# Patient Record
Sex: FEMALE | Race: WHITE | NOT HISPANIC OR LATINO | Employment: OTHER | URBAN - METROPOLITAN AREA
[De-identification: names, ages, dates, MRNs, and addresses within clinical notes are randomized per-mention and may not be internally consistent; named-entity substitution may affect disease eponyms.]

---

## 2017-08-18 ENCOUNTER — TELEPHONE (OUTPATIENT)
Dept: PRIMARY CARE CLINIC | Facility: CLINIC | Age: 82
End: 2017-08-18

## 2017-08-18 RX ORDER — NITROFURANTOIN MACROCRYSTALS 50 MG/1
CAPSULE ORAL
Qty: 90 CAPSULE | Refills: 3 | Status: ON HOLD | OUTPATIENT
Start: 2017-08-18 | End: 2017-12-08 | Stop reason: HOSPADM

## 2017-10-25 ENCOUNTER — TELEPHONE (OUTPATIENT)
Dept: PRIMARY CARE CLINIC | Facility: CLINIC | Age: 82
End: 2017-10-25

## 2017-10-25 NOTE — TELEPHONE ENCOUNTER
----- Message from Bri Doe MA sent at 10/25/2017  9:57 AM CDT -----  Contact: von forrest      ----- Message -----  From: Beth Hinojosa  Sent: 10/24/2017  11:37 AM  To: Nhan Potts Staff    Patient fell a couple nights ago. She is using the bathroom all over the home. She gives trouble giving baths etc. Home health aide nurse is what she is thinking to help her Please call back [phone] (home)   Thanks!

## 2017-10-27 ENCOUNTER — TELEPHONE (OUTPATIENT)
Dept: PRIMARY CARE CLINIC | Facility: CLINIC | Age: 82
End: 2017-10-27

## 2017-10-27 NOTE — TELEPHONE ENCOUNTER
----- Message from Jillian Gómez sent at 10/27/2017 11:28 AM CDT -----  Contact: Care giver  Briana, care giver [phone], Second time calling for home health to be order for patient, she feel out of bed on 10/20/2017 and can not take care of herself. Please advise. Thanks.

## 2017-11-02 ENCOUNTER — TELEPHONE (OUTPATIENT)
Dept: PRIMARY CARE CLINIC | Facility: CLINIC | Age: 82
End: 2017-11-02

## 2017-11-02 NOTE — TELEPHONE ENCOUNTER
----- Message from Jillian Gómez sent at 11/1/2017 10:32 AM CDT -----  Contact: Care taker  Briana, care taker [phone] daughter Tia [phone], Calling for an order for home health. She fell and has not been able to walk. Please advise. Thanks.

## 2017-11-02 NOTE — TELEPHONE ENCOUNTER
Spoke with daughter and notified that we would need her in for an appointment so that we can have documentation therefore will call me back so that we can get her one scheduled

## 2017-11-07 ENCOUNTER — TELEPHONE (OUTPATIENT)
Dept: PRIMARY CARE CLINIC | Facility: CLINIC | Age: 82
End: 2017-11-07

## 2017-11-07 NOTE — TELEPHONE ENCOUNTER
----- Message from Laura Buck sent at 11/7/2017 11:22 AM CST -----  Contact: Briana  Caregiver - Briana Lord - [phone] is calling/patient fell about two weeks ago and hurt her right knee and is having a lot of pain/she is asking if patient could be seen this week?/please advise

## 2017-11-07 NOTE — TELEPHONE ENCOUNTER
Caregiver states that patient is still having knee pain and would like to come in some time this week. Called and scheduled for thursday

## 2017-11-16 ENCOUNTER — OFFICE VISIT (OUTPATIENT)
Dept: PRIMARY CARE CLINIC | Facility: CLINIC | Age: 82
End: 2017-11-16
Payer: COMMERCIAL

## 2017-11-16 VITALS — DIASTOLIC BLOOD PRESSURE: 80 MMHG | TEMPERATURE: 98 F | HEART RATE: 77 BPM | SYSTOLIC BLOOD PRESSURE: 146 MMHG

## 2017-11-16 DIAGNOSIS — M10.061 ACUTE IDIOPATHIC GOUT OF RIGHT KNEE: ICD-10-CM

## 2017-11-16 DIAGNOSIS — B37.2 INTERTRIGINOUS CANDIDIASIS: ICD-10-CM

## 2017-11-16 DIAGNOSIS — M25.512 ACUTE PAIN OF BOTH SHOULDERS: Primary | ICD-10-CM

## 2017-11-16 DIAGNOSIS — M25.511 ACUTE PAIN OF BOTH SHOULDERS: Primary | ICD-10-CM

## 2017-11-16 DIAGNOSIS — M25.561 ACUTE PAIN OF RIGHT KNEE: ICD-10-CM

## 2017-11-16 PROCEDURE — 99999 PR PBB SHADOW E&M-EST. PATIENT-LVL III: CPT | Mod: PBBFAC,,, | Performed by: INTERNAL MEDICINE

## 2017-11-16 PROCEDURE — 96372 THER/PROPH/DIAG INJ SC/IM: CPT | Mod: S$GLB,,, | Performed by: INTERNAL MEDICINE

## 2017-11-16 PROCEDURE — 99213 OFFICE O/P EST LOW 20 MIN: CPT | Mod: 25,S$GLB,, | Performed by: INTERNAL MEDICINE

## 2017-11-16 RX ORDER — AMLODIPINE BESYLATE 5 MG/1
5 TABLET ORAL DAILY
COMMUNITY
End: 2018-01-03 | Stop reason: SDUPTHER

## 2017-11-16 RX ORDER — CLOTRIMAZOLE AND BETAMETHASONE DIPROPIONATE 10; .64 MG/G; MG/G
CREAM TOPICAL 2 TIMES DAILY
Status: CANCELLED | OUTPATIENT
Start: 2017-11-16

## 2017-11-16 RX ORDER — CLOTRIMAZOLE AND BETAMETHASONE DIPROPIONATE 10; .64 MG/G; MG/G
CREAM TOPICAL 2 TIMES DAILY
COMMUNITY
End: 2017-11-17 | Stop reason: SDUPTHER

## 2017-11-16 RX ORDER — CETIRIZINE HYDROCHLORIDE 10 MG/1
10 TABLET ORAL DAILY
Status: ON HOLD | COMMUNITY
End: 2017-12-08 | Stop reason: HOSPADM

## 2017-11-16 RX ORDER — KETOROLAC TROMETHAMINE 30 MG/ML
15 INJECTION, SOLUTION INTRAMUSCULAR; INTRAVENOUS
Status: COMPLETED | OUTPATIENT
Start: 2017-11-16 | End: 2017-11-16

## 2017-11-16 RX ORDER — ASPIRIN 81 MG/1
81 TABLET ORAL DAILY
COMMUNITY
End: 2020-01-01

## 2017-11-16 RX ORDER — GLIMEPIRIDE 4 MG/1
4 TABLET ORAL
COMMUNITY
End: 2018-01-03 | Stop reason: SDUPTHER

## 2017-11-16 RX ORDER — BETAMETHASONE SODIUM PHOSPHATE AND BETAMETHASONE ACETATE 3; 3 MG/ML; MG/ML
12 INJECTION, SUSPENSION INTRA-ARTICULAR; INTRALESIONAL; INTRAMUSCULAR; SOFT TISSUE
Status: COMPLETED | OUTPATIENT
Start: 2017-11-16 | End: 2017-11-16

## 2017-11-16 RX ORDER — METFORMIN HYDROCHLORIDE 500 MG/1
500 TABLET ORAL 2 TIMES DAILY WITH MEALS
COMMUNITY
End: 2018-01-03 | Stop reason: SDUPTHER

## 2017-11-16 RX ADMIN — KETOROLAC TROMETHAMINE 15 MG: 30 INJECTION, SOLUTION INTRAMUSCULAR; INTRAVENOUS at 05:11

## 2017-11-16 RX ADMIN — BETAMETHASONE SODIUM PHOSPHATE AND BETAMETHASONE ACETATE 12 MG: 3; 3 INJECTION, SUSPENSION INTRA-ARTICULAR; INTRALESIONAL; INTRAMUSCULAR; SOFT TISSUE at 05:11

## 2017-11-16 NOTE — PROGRESS NOTES
2 patient identifiers used, Celestone 12mg IM administered to left hip using aseptic technique, patient tolerated well, no bleeding to site bandaid applied. Ketorolac 30mg IM administered to right hip using aseptic technique, patient tolerated well, no bleeding to site bandage applied.

## 2017-11-17 RX ORDER — CLOTRIMAZOLE AND BETAMETHASONE DIPROPIONATE 10; .64 MG/G; MG/G
CREAM TOPICAL 2 TIMES DAILY
Qty: 45 G | Refills: 2 | Status: SHIPPED | OUTPATIENT
Start: 2017-11-17 | End: 2018-03-01 | Stop reason: SDUPTHER

## 2017-11-17 RX ORDER — FLUCONAZOLE 150 MG/1
150 TABLET ORAL DAILY
Qty: 7 TABLET | Refills: 1 | Status: SHIPPED | OUTPATIENT
Start: 2017-11-17 | End: 2017-11-24

## 2017-11-17 NOTE — PROGRESS NOTES
Subjective:       Patient ID: Hedy Pierre is a 89 y.o. female.    Chief Complaint: Arm Pain (bilat shoulder pain, s/p recent fall, xrays negative fx)    HPI Pt radha by neighbor has been falling down in ER had xrays no fx but hurt all over and bad itching skin rash groins braests and buttock with bed sores will try get H/H live alone  Review of Systems    Objective:      Physical Exam   Constitutional: She is oriented to person, place, and time. She appears well-developed and well-nourished. No distress.   HENT:   Head: Normocephalic and atraumatic.   Right Ear: External ear normal.   Left Ear: External ear normal.   Nose: Nose normal.   Mouth/Throat: Oropharynx is clear and moist. No oropharyngeal exudate.   Eyes: Conjunctivae and EOM are normal. Pupils are equal, round, and reactive to light. Right eye exhibits no discharge. Left eye exhibits no discharge.   Neck: Normal range of motion. Neck supple. No thyromegaly present.   Cardiovascular: Normal rate, regular rhythm, normal heart sounds and intact distal pulses.  Exam reveals no gallop and no friction rub.    No murmur heard.  Pulmonary/Chest: Effort normal and breath sounds normal. No respiratory distress. She has no wheezes. She has no rales. She exhibits no tenderness.   Abdominal: Soft. Bowel sounds are normal. She exhibits no distension. There is no tenderness. There is no rebound and no guarding.   Musculoskeletal: Normal range of motion. She exhibits tenderness (multiple joint pain worse at rt knee). She exhibits no edema or deformity.   Lymphadenopathy:     She has no cervical adenopathy.   Neurological: She is alert and oriented to person, place, and time.   Skin: Skin is warm and dry. Capillary refill takes less than 2 seconds. Rash (patchy erythematous satelite lesions under rt breast and groins ) noted. No erythema.   Psychiatric: She has a normal mood and affect. Judgment and thought content normal.   Nursing note and vitals reviewed.       Assessment:       1. Acute pain of both shoulders    2. Acute pain of right knee    3. Acute idiopathic gout of right knee    4. Intertriginous candidiasis        Plan:       Acute pain of both shoulders  -     betamethasone acetate-betamethasone sodium phosphate injection 12 mg; Inject 2 mLs (12 mg total) into the muscle one time.  -     ketorolac injection 15 mg; Inject 15 mg into the vein one time.    Acute pain of right knee    Acute idiopathic gout of right knee    Intertriginous candidiasis    Other orders  -     Cancel: clotrimazole-betamethasone 1-0.05% (LOTRISONE) cream; Apply topically 2 (two) times daily.

## 2017-11-28 ENCOUNTER — TELEPHONE (OUTPATIENT)
Dept: PRIMARY CARE CLINIC | Facility: CLINIC | Age: 82
End: 2017-11-28

## 2017-11-28 NOTE — TELEPHONE ENCOUNTER
----- Message from Jillian Araya sent at 11/24/2017  3:48 PM CST -----  Please call Briana Watson / [phone] ... Asking to discuss update on home health orders

## 2017-12-05 PROBLEM — N30.01 ACUTE CYSTITIS WITH HEMATURIA: Status: ACTIVE | Noted: 2017-12-05

## 2017-12-06 PROBLEM — L01.00 IMPETIGO: Status: ACTIVE | Noted: 2017-12-06

## 2017-12-06 PROBLEM — I10 ESSENTIAL HYPERTENSION: Status: ACTIVE | Noted: 2017-12-06

## 2017-12-06 PROBLEM — E86.0 DEHYDRATION: Status: ACTIVE | Noted: 2017-12-06

## 2017-12-06 PROBLEM — H90.6 MIXED CONDUCTIVE AND SENSORINEURAL HEARING LOSS OF BOTH EARS: Status: ACTIVE | Noted: 2017-12-06

## 2017-12-08 PROBLEM — I10 ESSENTIAL HYPERTENSION: Status: RESOLVED | Noted: 2017-12-06 | Resolved: 2017-12-08

## 2017-12-08 PROBLEM — E86.0 DEHYDRATION: Status: RESOLVED | Noted: 2017-12-06 | Resolved: 2017-12-08

## 2017-12-13 ENCOUNTER — TELEPHONE (OUTPATIENT)
Dept: PRIMARY CARE CLINIC | Facility: CLINIC | Age: 82
End: 2017-12-13
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
## 2017-12-27 ENCOUNTER — OFFICE VISIT (OUTPATIENT)
Dept: PRIMARY CARE CLINIC | Facility: CLINIC | Age: 82
End: 2017-12-27
Payer: COMMERCIAL

## 2017-12-27 VITALS
BODY MASS INDEX: 30.73 KG/M2 | DIASTOLIC BLOOD PRESSURE: 72 MMHG | SYSTOLIC BLOOD PRESSURE: 132 MMHG | HEIGHT: 64 IN | OXYGEN SATURATION: 98 % | HEART RATE: 72 BPM | RESPIRATION RATE: 18 BRPM | TEMPERATURE: 98 F | WEIGHT: 180 LBS

## 2017-12-27 DIAGNOSIS — M25.511 ACUTE PAIN OF BOTH SHOULDERS: Primary | ICD-10-CM

## 2017-12-27 DIAGNOSIS — M17.0 PRIMARY OSTEOARTHRITIS OF BOTH KNEES: ICD-10-CM

## 2017-12-27 DIAGNOSIS — L89.152 SACRAL DECUBITUS ULCER, STAGE II: ICD-10-CM

## 2017-12-27 DIAGNOSIS — M25.512 ACUTE PAIN OF BOTH SHOULDERS: Primary | ICD-10-CM

## 2017-12-27 DIAGNOSIS — M25.561 ACUTE PAIN OF RIGHT KNEE: ICD-10-CM

## 2017-12-27 PROCEDURE — 96372 THER/PROPH/DIAG INJ SC/IM: CPT | Mod: S$GLB,,, | Performed by: INTERNAL MEDICINE

## 2017-12-27 PROCEDURE — 99213 OFFICE O/P EST LOW 20 MIN: CPT | Mod: 25,S$GLB,, | Performed by: INTERNAL MEDICINE

## 2017-12-27 PROCEDURE — 99999 PR PBB SHADOW E&M-EST. PATIENT-LVL III: CPT | Mod: PBBFAC,,, | Performed by: INTERNAL MEDICINE

## 2017-12-27 RX ORDER — BETAMETHASONE SODIUM PHOSPHATE AND BETAMETHASONE ACETATE 3; 3 MG/ML; MG/ML
12 INJECTION, SUSPENSION INTRA-ARTICULAR; INTRALESIONAL; INTRAMUSCULAR; SOFT TISSUE
Status: COMPLETED | OUTPATIENT
Start: 2017-12-27 | End: 2017-12-27

## 2017-12-27 RX ORDER — KETOROLAC TROMETHAMINE 30 MG/ML
30 INJECTION, SOLUTION INTRAMUSCULAR; INTRAVENOUS
Status: DISCONTINUED | OUTPATIENT
Start: 2017-12-27 | End: 2017-12-27

## 2017-12-27 RX ORDER — KETOROLAC TROMETHAMINE 30 MG/ML
30 INJECTION, SOLUTION INTRAMUSCULAR; INTRAVENOUS ONCE
Status: COMPLETED | OUTPATIENT
Start: 2017-12-27 | End: 2017-12-27

## 2017-12-27 RX ADMIN — BETAMETHASONE SODIUM PHOSPHATE AND BETAMETHASONE ACETATE 12 MG: 3; 3 INJECTION, SUSPENSION INTRA-ARTICULAR; INTRALESIONAL; INTRAMUSCULAR; SOFT TISSUE at 02:12

## 2017-12-27 RX ADMIN — KETOROLAC TROMETHAMINE 30 MG: 30 INJECTION, SOLUTION INTRAMUSCULAR; INTRAVENOUS at 02:12

## 2017-12-27 NOTE — PROGRESS NOTES
2 patient identifiers used, (name &), order checked, allergies checked,2 cc's of celestone given per order using aseptic technique, 1 cc of toradol given per order using aseptic technique; both medications given in right upper outer quad gluteus, patient tolerated well , 3/10 pain scale , no bleeding at insertion site; no adverse effects noted.

## 2017-12-29 ENCOUNTER — TELEPHONE (OUTPATIENT)
Dept: PRIMARY CARE CLINIC | Facility: CLINIC | Age: 82
End: 2017-12-29

## 2017-12-29 DIAGNOSIS — L89.152 SACRAL DECUBITUS ULCER, STAGE II: Primary | ICD-10-CM

## 2017-12-29 NOTE — PROGRESS NOTES
Subjective:       Patient ID: Hedy Pierre is a 89 y.o. female.    Chief Complaint: Follow-up    HPI   Patient is here for follow from the hospital have UTI decubiti and staph infection on the skin infection on the skin is better UTI better but decubitusnot better need wound care and home hea and special mattress out of bed place and also complained ofpain in lower back and both knees from arthritis requests injections no fever no short of breath or chest pain patient lives with her granddaughte at home  Review of Systems    Objective:      Physical Exam   Constitutional: She is oriented to person, place, and time. She appears well-developed and well-nourished. No distress.   Comfortable in wheelchair   HENT:   Head: Normocephalic and atraumatic.   Right Ear: External ear normal.   Left Ear: External ear normal.   Nose: Nose normal.   Mouth/Throat: Oropharynx is clear and moist. No oropharyngeal exudate.   Eyes: Conjunctivae and EOM are normal. Pupils are equal, round, and reactive to light. Right eye exhibits no discharge. Left eye exhibits no discharge.   Neck: Normal range of motion. Neck supple. No thyromegaly present.   Cardiovascular: Normal rate, regular rhythm, normal heart sounds and intact distal pulses.  Exam reveals no gallop and no friction rub.    No murmur heard.  Pulmonary/Chest: Effort normal and breath sounds normal. No respiratory distress. She has no wheezes. She has no rales. She exhibits no tenderness.   Abdominal: Soft. Bowel sounds are normal. She exhibits no distension. There is no tenderness. There is no rebound and no guarding.   Musculoskeletal: Normal range of motion. She exhibits tenderness (bilateral knee pain lower back pian ad bilateral shoulders pain). She exhibits no edema or deformity.   Lymphadenopathy:     She has no cervical adenopathy.   Neurological: She is alert and oriented to person, place, and time.   Skin: Skin is warm and dry. Capillary refill takes less than 2  seconds. No rash noted. No erythema.   Bruises and sacral decubitus grade 2   Psychiatric: She has a normal mood and affect. Judgment and thought content normal.   Nursing note and vitals reviewed.      Assessment:       1. Acute pain of both shoulders    2. Acute pain of right knee    3. Sacral decubitus ulcer, stage II    4. Primary osteoarthritis of both knees        Plan:       Acute pain of both shoulders  -     betamethasone acetate-betamethasone sodium phosphate injection 12 mg; Inject 2 mLs (12 mg total) into the muscle one time.  -     Discontinue: ketorolac injection 30 mg; Inject 30 mg into the vein one time.    Acute pain of right knee  -     betamethasone acetate-betamethasone sodium phosphate injection 12 mg; Inject 2 mLs (12 mg total) into the muscle one time.  -     Discontinue: ketorolac injection 30 mg; Inject 30 mg into the vein one time.  -     ketorolac injection 30 mg; Inject 30 mg into the muscle once.    Sacral decubitus ulcer, stage II  Comments:  resume same H/H for wound care and PT OT in increase activity may need air matresses    Primary osteoarthritis of both knees

## 2017-12-29 NOTE — TELEPHONE ENCOUNTER
----- Message from Jillian Gómez sent at 12/29/2017 10:33 AM CST -----  Contact: Care Giver  Briana, care giver [phone], Calling because home health and wound care were suppose to ordered for patient. They have not heard from anyone. They also needs measurements called to [facility] for patch that was prescribed for sore on buttocks. Please advise. Thanks.

## 2017-12-29 NOTE — TELEPHONE ENCOUNTER
----- Message from Tonie Kenny sent at 12/27/2017  4:25 PM CST -----  Contact: Briana/power of   Briana/power of  [phone] needing to speak with office regarding home health care, needs to go to [facility] attention: Intake. Fax number [phone] Phone number [phone]/ home health. Please advise. Thanks.

## 2018-01-04 RX ORDER — AMLODIPINE BESYLATE 5 MG/1
TABLET ORAL
Qty: 90 TABLET | Refills: 1 | Status: SHIPPED | OUTPATIENT
Start: 2018-01-04 | End: 2018-05-15 | Stop reason: SDUPTHER

## 2018-01-04 RX ORDER — METFORMIN HYDROCHLORIDE 500 MG/1
TABLET ORAL
Qty: 180 TABLET | Refills: 1 | Status: SHIPPED | OUTPATIENT
Start: 2018-01-04 | End: 2018-05-15 | Stop reason: SDUPTHER

## 2018-01-04 RX ORDER — GLIMEPIRIDE 4 MG/1
TABLET ORAL
Qty: 90 TABLET | Refills: 1 | Status: SHIPPED | OUTPATIENT
Start: 2018-01-04 | End: 2018-05-15 | Stop reason: SDUPTHER

## 2018-01-23 ENCOUNTER — TELEPHONE (OUTPATIENT)
Dept: PRIMARY CARE CLINIC | Facility: CLINIC | Age: 83
End: 2018-01-23

## 2018-01-23 NOTE — TELEPHONE ENCOUNTER
----- Message from Rufina Gaytan sent at 1/22/2018 10:28 AM CST -----  Contact: Hannah Young states patient needs to see about getting patient Home Health Aid   Please call back [phone]

## 2018-01-26 NOTE — TELEPHONE ENCOUNTER
myrtle or any nurse call [facility] H/H see if can see pt for wound care asap

## 2018-02-09 ENCOUNTER — OFFICE VISIT (OUTPATIENT)
Dept: PRIMARY CARE CLINIC | Facility: CLINIC | Age: 83
End: 2018-02-09
Payer: MEDICARE

## 2018-02-09 VITALS
RESPIRATION RATE: 18 BRPM | DIASTOLIC BLOOD PRESSURE: 63 MMHG | HEART RATE: 91 BPM | HEIGHT: 63 IN | OXYGEN SATURATION: 99 % | TEMPERATURE: 98 F | SYSTOLIC BLOOD PRESSURE: 113 MMHG

## 2018-02-09 DIAGNOSIS — I10 HYPERTENSION, UNSPECIFIED TYPE: ICD-10-CM

## 2018-02-09 DIAGNOSIS — E86.0 DEHYDRATION: ICD-10-CM

## 2018-02-09 DIAGNOSIS — N39.0 CHRONIC URINARY TRACT INFECTION: ICD-10-CM

## 2018-02-09 DIAGNOSIS — R06.02 SHORTNESS OF BREATH: ICD-10-CM

## 2018-02-09 DIAGNOSIS — R05.9 COUGH: Primary | ICD-10-CM

## 2018-02-09 DIAGNOSIS — R53.83 FATIGUE, UNSPECIFIED TYPE: ICD-10-CM

## 2018-02-09 DIAGNOSIS — R32 URINARY INCONTINENCE, UNSPECIFIED TYPE: ICD-10-CM

## 2018-02-09 PROCEDURE — 3008F BODY MASS INDEX DOCD: CPT | Mod: S$GLB,,, | Performed by: NURSE PRACTITIONER

## 2018-02-09 PROCEDURE — 1159F MED LIST DOCD IN RCRD: CPT | Mod: S$GLB,,, | Performed by: NURSE PRACTITIONER

## 2018-02-09 PROCEDURE — 99999 PR PBB SHADOW E&M-EST. PATIENT-LVL IV: CPT | Mod: PBBFAC,,, | Performed by: NURSE PRACTITIONER

## 2018-02-09 PROCEDURE — 1126F AMNT PAIN NOTED NONE PRSNT: CPT | Mod: S$GLB,,, | Performed by: NURSE PRACTITIONER

## 2018-02-09 PROCEDURE — 99214 OFFICE O/P EST MOD 30 MIN: CPT | Mod: S$GLB,,, | Performed by: NURSE PRACTITIONER

## 2018-02-09 RX ORDER — LEVOFLOXACIN 500 MG/1
500 TABLET, FILM COATED ORAL DAILY
Qty: 7 TABLET | Refills: 0 | Status: SHIPPED | OUTPATIENT
Start: 2018-02-09 | End: 2018-02-16

## 2018-02-09 RX ORDER — NITROFURANTOIN MACROCRYSTALS 50 MG/1
50 CAPSULE ORAL DAILY
COMMUNITY
End: 2018-03-01

## 2018-02-09 RX ORDER — ACETAMINOPHEN 500 MG
1 TABLET ORAL DAILY
COMMUNITY
End: 2018-12-29

## 2018-02-09 NOTE — PROGRESS NOTES
Chief Complaint  Chief Complaint   Patient presents with    Cough    Sore Throat       HPI  Hedy Pierre is a 89 y.o. female with multiple medical diagnoses as listed in the medical history and problem list that presents for cough, congestion, right ear pain.  Patient is new to me but is known to this clinic with her last appointment being 12/27/2017.  Presents with a family friend who reports noting generalized fatigue and slight confusion over the past three weeks with the patient. Accompanied by intermittent cough, shortness of breath. Patient reports right ear pain and dizziness. No fever or sweats. Patient with chronic bladder infections currently on macrobid. Patient denies dysuria, hematuria, suprapubic pressure. Patient is incontinent and unable to give a urine sample at this time. H/o HTN presents to clinic with BP in low normal. Did not take BP medication this am. Patient denies cp, sob, palpitations.       PAST MEDICAL HISTORY:  Past Medical History:   Diagnosis Date    Arthritis     Diabetes mellitus     Hypertension     Kidney stone        PAST SURGICAL HISTORY:  History reviewed. No pertinent surgical history.    SOCIAL HISTORY:  Social History     Social History    Marital status: Single     Spouse name: N/A    Number of children: N/A    Years of education: N/A     Occupational History    Not on file.     Social History Main Topics    Smoking status: Never Smoker    Smokeless tobacco: Never Used    Alcohol use No    Drug use: No    Sexual activity: Not on file     Other Topics Concern    Not on file     Social History Narrative    No narrative on file       FAMILY HISTORY:  Family History   Problem Relation Age of Onset    Family history unknown: Yes       ALLERGIES AND MEDICATIONS: updated and reviewed.  Review of patient's allergies indicates:   Allergen Reactions    Sulfa (sulfonamide antibiotics)     Pcn [penicillins] Rash     Current Outpatient Prescriptions   Medication  Sig Dispense Refill    amLODIPine (NORVASC) 5 MG tablet TAKE 1 TABLET EVERY DAY 90 tablet 1    aspirin (ECOTRIN) 81 MG EC tablet Take 81 mg by mouth once daily.      blood sugar diagnostic (BLOOD GLUCOSE TEST) Strp by Misc.(Non-Drug; Combo Route) route 2 (two) times daily.      cholecalciferol, vitamin D3, (VITAMIN D3) 2,000 unit Cap Take 1 capsule by mouth once daily.      clotrimazole-betamethasone 1-0.05% (LOTRISONE) cream Apply topically 2 (two) times daily. 45 g 2    glimepiride (AMARYL) 4 MG tablet TAKE 1 TABLET ONE TIME DAILY WITH BREAKFAST OR THE FIRST MAIN MEAL OF THE DAY 90 tablet 1    metFORMIN (GLUCOPHAGE) 500 MG tablet TAKE 1 TABLET TWICE DAILY WITH MEALS 180 tablet 1    nczoq-inyss-byzccim-pramoxine 3.5-500-10,000 mg-unit-unit/g Oint       nitrofurantoin (MACRODANTIN) 50 MG capsule Take 50 mg by mouth once daily.       No current facility-administered medications for this visit.          ROS  Review of Systems   Constitutional: Positive for fatigue. Negative for chills and fever.   HENT: Positive for congestion, ear pain, sinus pressure and sore throat. Negative for rhinorrhea.    Respiratory: Positive for cough and shortness of breath. Negative for chest tightness.    Cardiovascular: Negative for chest pain and palpitations.   Gastrointestinal: Negative for blood in stool, diarrhea, nausea and vomiting.   Genitourinary: Positive for difficulty urinating. Negative for dysuria, frequency, hematuria and urgency.   Musculoskeletal: Negative for arthralgias and back pain.   Skin: Positive for wound. Negative for rash.   Neurological: Positive for dizziness. Negative for headaches.   Psychiatric/Behavioral: Positive for confusion. Negative for dysphoric mood and sleep disturbance. The patient is not nervous/anxious.          PHYSICAL EXAM  Vitals:    02/09/18 0903   BP: 113/63   BP Location: Left arm   Patient Position: Sitting   BP Method: Medium (Automatic)   Pulse: 91   Resp: 18   Temp: 97.5 °F  "(36.4 °C)   TempSrc: Oral   SpO2: 99%   Height: 5' 3" (1.6 m)    There is no height or weight on file to calculate BMI.      Height: 5' 3" (160 cm)     Physical Exam   Constitutional: She is oriented to person, place, and time. She appears well-developed and well-nourished.   HENT:   Head: Normocephalic.   Right Ear: Tympanic membrane normal.   Left Ear: Tympanic membrane normal.   Mouth/Throat: Uvula is midline, oropharynx is clear and moist and mucous membranes are normal.   Eyes: Conjunctivae are normal.   Cardiovascular: Normal rate, regular rhythm, normal heart sounds and normal pulses.    No murmur heard.  Pulses:       Radial pulses are 2+ on the right side, and 2+ on the left side.   No LE swelling noted   Pulmonary/Chest: Effort normal and breath sounds normal. She has no wheezes.   Mildly decreased BS to right lower lobe. No noted wheezing or sob.    Abdominal: Soft. Bowel sounds are normal. There is no tenderness.   Genitourinary:   Genitourinary Comments: Patient unable to void on command. Diaper dry.    Musculoskeletal: She exhibits no edema.   Lymphadenopathy:     She has no cervical adenopathy.   Neurological: She is alert and oriented to person, place, and time.   Skin: Skin is warm and dry. No rash noted.        Psychiatric: She has a normal mood and affect.   Patient is [place].      Results for orders placed or performed during the hospital encounter of 12/05/17   Blood culture   Result Value Ref Range    Blood Culture, Routine No growth after 5 days.    Blood culture   Result Value Ref Range    Blood Culture, Routine No growth after 5 days.    Urine culture   Result Value Ref Range    Urine Culture, Routine No significant growth    Clostridium difficile EIA   Result Value Ref Range    C. diff Antigen Negative Negative    C difficile Toxins A+B, EIA Negative Negative   CBC auto differential   Result Value Ref Range    WBC 13.40 (H) 3.90 - 12.70 K/uL    RBC 4.71 4.00 - 5.40 M/uL    Hemoglobin 12.8 12.0 " - 16.0 g/dL    Hematocrit 39.5 37.0 - 48.5 %    MCV 84 82 - 98 fL    MCH 27.1 27.0 - 31.0 pg    MCHC 32.3 32.0 - 36.0 g/dL    RDW 15.4 (H) 11.5 - 14.5 %    Platelets 162 150 - 350 K/uL    MPV 9.2 9.2 - 12.9 fL    Gran # (ANC) 11.3 (H) 1.8 - 7.7 K/uL    Lymph # 1.1 1.0 - 4.8 K/uL    Mono # 0.8 0.3 - 1.0 K/uL    Eos # 0.1 0.0 - 0.5 K/uL    Baso # 0.00 0.00 - 0.20 K/uL    Gran% 84.7 (H) 38.0 - 73.0 %    Lymph% 8.5 (L) 18.0 - 48.0 %    Mono% 6.1 4.0 - 15.0 %    Eosinophil% 0.4 0.0 - 8.0 %    Basophil% 0.3 0.0 - 1.9 %    Differential Method Automated    Comprehensive metabolic panel   Result Value Ref Range    Sodium 133 (L) 136 - 144 mmol/L    Potassium 4.1 3.6 - 5.1 mmol/L    Chloride 100 (L) 101 - 111 mmol/L    CO2 23 21 - 27 mmol/L    Glucose 129 (H) 74 - 118 mg/dL    BUN, Bld 24 8 - 26 mg/dL    Creatinine 0.8 0.4 - 1.0 mg/dL    Calcium 9.7 8.6 - 10.0 mg/dL    Total Protein 6.8 6.5 - 8.1 g/dL    Albumin 3.6 3.5 - 5.0 g/dL    Total Bilirubin 0.7 mg/dL    Alkaline Phosphatase 124 38 - 126 U/L    AST 27 15 - 41 U/L    ALT 18 14 - 54 U/L    Anion Gap 10 8 - 16 mmol/L    eGFR if African American >60.0 >60 mL/min/1.73 m^2    eGFR if non African American >60.0 >60 mL/min/1.73 m^2   Lactic acid, plasma   Result Value Ref Range    Lactate (Lactic Acid) 1.5 0.5 - 2.2 mmol/L   Lipase   Result Value Ref Range    Lipase 19 10 - 51 U/L   Amylase   Result Value Ref Range    Amylase 38 20 - 100 U/L   Urinalysis   Result Value Ref Range    Specimen UA Urine, Catheterized     Color, UA Yellow Yellow, Straw, Kiarra    Appearance, UA Hazy (A) Clear    pH, UA 6.0 5.0 - 8.0    Specific Gravity, UA 1.015 1.005 - 1.030    Protein, UA Trace (A) Negative    Glucose, UA Negative Negative    Ketones, UA Negative Negative    Bilirubin (UA) Negative Negative    Occult Blood UA 2+ (A) Negative    Nitrite, UA Positive (A) Negative    Urobilinogen, UA 1.0 Negative EU/dL    Leukocytes, UA 2+ (A) Negative   TSH   Result Value Ref Range    TSH 1.25 0.34  - 5.60 uIU/mL   Magnesium   Result Value Ref Range    Magnesium 1.3 (L) 1.8 - 2.5 mg/dL   Urinalysis Microscopic   Result Value Ref Range    RBC, UA 14 (H) 0 - 4 /hpf    WBC, UA 80 (H) 0 - 5 /hpf    Bacteria, UA Many (A) None-Occ /hpf    Microscopic Comment SEE COMMENT    CBC auto differential   Result Value Ref Range    WBC 9.10 3.90 - 12.70 K/uL    RBC 4.34 4.00 - 5.40 M/uL    Hemoglobin 11.9 (L) 12.0 - 16.0 g/dL    Hematocrit 36.4 (L) 37.0 - 48.5 %    MCV 84 82 - 98 fL    MCH 27.5 27.0 - 31.0 pg    MCHC 32.8 32.0 - 36.0 g/dL    RDW 15.9 (H) 11.5 - 14.5 %    Platelets 146 (L) 150 - 350 K/uL    MPV 8.7 (L) 9.2 - 12.9 fL    Gran # (ANC) 6.5 1.8 - 7.7 K/uL    Lymph # 1.5 1.0 - 4.8 K/uL    Mono # 0.8 0.3 - 1.0 K/uL    Eos # 0.3 0.0 - 0.5 K/uL    Baso # 0.00 0.00 - 0.20 K/uL    Gran% 71.7 38.0 - 73.0 %    Lymph% 16.2 (L) 18.0 - 48.0 %    Mono% 8.5 4.0 - 15.0 %    Eosinophil% 3.3 0.0 - 8.0 %    Basophil% 0.3 0.0 - 1.9 %    Differential Method Automated    Comprehensive metabolic panel   Result Value Ref Range    Sodium 133 (L) 136 - 144 mmol/L    Potassium 3.6 3.6 - 5.1 mmol/L    Chloride 99 (L) 101 - 111 mmol/L    CO2 23 21 - 27 mmol/L    Glucose 146 (H) 74 - 118 mg/dL    BUN, Bld 23 8 - 26 mg/dL    Creatinine 0.7 0.4 - 1.0 mg/dL    Calcium 9.5 8.6 - 10.0 mg/dL    Total Protein 6.1 (L) 6.5 - 8.1 g/dL    Albumin 3.0 (L) 3.5 - 5.0 g/dL    Total Bilirubin 0.5 mg/dL    Alkaline Phosphatase 98 38 - 126 U/L    AST 19 15 - 41 U/L    ALT 14 14 - 54 U/L    Anion Gap 11 8 - 16 mmol/L    eGFR if African American >60.0 >60 mL/min/1.73 m^2    eGFR if non African American >60.0 >60 mL/min/1.73 m^2   Magnesium   Result Value Ref Range    Magnesium 1.4 (L) 1.8 - 2.5 mg/dL   CBC auto differential   Result Value Ref Range    WBC 7.30 3.90 - 12.70 K/uL    RBC 4.22 4.00 - 5.40 M/uL    Hemoglobin 11.5 (L) 12.0 - 16.0 g/dL    Hematocrit 35.5 (L) 37.0 - 48.5 %    MCV 84 82 - 98 fL    MCH 27.2 27.0 - 31.0 pg    MCHC 32.3 32.0 - 36.0 g/dL     RDW 15.7 (H) 11.5 - 14.5 %    Platelets 154 150 - 350 K/uL    MPV 8.3 (L) 9.2 - 12.9 fL    Gran # (ANC) 5.4 1.8 - 7.7 K/uL    Lymph # 1.1 1.0 - 4.8 K/uL    Mono # 0.5 0.3 - 1.0 K/uL    Eos # 0.3 0.0 - 0.5 K/uL    Baso # 0.00 0.00 - 0.20 K/uL    Gran% 73.6 (H) 38.0 - 73.0 %    Lymph% 14.7 (L) 18.0 - 48.0 %    Mono% 7.5 4.0 - 15.0 %    Eosinophil% 3.8 0.0 - 8.0 %    Basophil% 0.4 0.0 - 1.9 %    Differential Method Automated    Comprehensive metabolic panel   Result Value Ref Range    Sodium 131 (L) 136 - 144 mmol/L    Potassium 3.7 3.6 - 5.1 mmol/L    Chloride 102 101 - 111 mmol/L    CO2 24 21 - 27 mmol/L    Glucose 166 (H) 74 - 118 mg/dL    BUN, Bld 23 8 - 26 mg/dL    Creatinine 0.8 0.4 - 1.0 mg/dL    Calcium 8.9 8.6 - 10.0 mg/dL    Total Protein 5.6 (L) 6.5 - 8.1 g/dL    Albumin 2.7 (L) 3.5 - 5.0 g/dL    Total Bilirubin 0.3 mg/dL    Alkaline Phosphatase 91 38 - 126 U/L    AST 19 15 - 41 U/L    ALT 13 (L) 14 - 54 U/L    Anion Gap 5 (L) 8 - 16 mmol/L    eGFR if African American >60.0 >60 mL/min/1.73 m^2    eGFR if non African American >60.0 >60 mL/min/1.73 m^2   Magnesium   Result Value Ref Range    Magnesium 1.4 (L) 1.8 - 2.5 mg/dL   CBC auto differential   Result Value Ref Range    WBC 7.30 3.90 - 12.70 K/uL    RBC 4.22 4.00 - 5.40 M/uL    Hemoglobin 11.5 (L) 12.0 - 16.0 g/dL    Hematocrit 35.5 (L) 37.0 - 48.5 %    MCV 84 82 - 98 fL    MCH 27.2 27.0 - 31.0 pg    MCHC 32.3 32.0 - 36.0 g/dL    RDW 15.7 (H) 11.5 - 14.5 %    Platelets 154 150 - 350 K/uL    MPV 8.3 (L) 9.2 - 12.9 fL    Gran # (ANC) 5.4 1.8 - 7.7 K/uL    Lymph # 1.1 1.0 - 4.8 K/uL    Mono # 0.5 0.3 - 1.0 K/uL    Eos # 0.3 0.0 - 0.5 K/uL    Baso # 0.00 0.00 - 0.20 K/uL    Gran% 73.6 (H) 38.0 - 73.0 %    Lymph% 14.7 (L) 18.0 - 48.0 %    Mono% 7.5 4.0 - 15.0 %    Eosinophil% 3.8 0.0 - 8.0 %    Basophil% 0.4 0.0 - 1.9 %    Differential Method Automated    Comprehensive metabolic panel   Result Value Ref Range    Sodium 131 (L) 136 - 144 mmol/L    Potassium  3.7 3.6 - 5.1 mmol/L    Chloride 102 101 - 111 mmol/L    CO2 24 21 - 27 mmol/L    Glucose 166 (H) 74 - 118 mg/dL    BUN, Bld 23 8 - 26 mg/dL    Creatinine 0.8 0.4 - 1.0 mg/dL    Calcium 8.9 8.6 - 10.0 mg/dL    Total Protein 5.6 (L) 6.5 - 8.1 g/dL    Albumin 2.7 (L) 3.5 - 5.0 g/dL    Total Bilirubin 0.3 mg/dL    Alkaline Phosphatase 91 38 - 126 U/L    AST 19 15 - 41 U/L    ALT 13 (L) 14 - 54 U/L    Anion Gap 5 (L) 8 - 16 mmol/L    eGFR if African American >60.0 >60 mL/min/1.73 m^2    eGFR if non African American >60.0 >60 mL/min/1.73 m^2   Magnesium   Result Value Ref Range    Magnesium 1.4 (L) 1.8 - 2.5 mg/dL   POCT glucose   Result Value Ref Range    POCT Glucose 130 (H) 70 - 110 mg/dL         Health Maintenance       Date Due Completion Date    Lipid Panel 12/29/1926 ---    Hemoglobin A1c 12/29/1926 ---    Foot Exam 12/29/1936 ---    Eye Exam 12/29/1936 ---    Urine Microalbumin 12/29/1936 ---    TETANUS VACCINE 12/29/1944 ---    Zoster Vaccine 12/29/1986 ---    Pneumococcal (65+) (1 of 2 - PCV13) 12/29/1991 ---    Influenza Vaccine 08/01/2017 ---            Assessment & Plan    Hedy was seen today for cough and sore throat.    Diagnoses and all orders for this visit:    Cough  -     X-Ray Chest PA And Lateral; Future - atelectasis present. No infiltrates. Family aware.     Shortness of breath  -     X-Ray Chest PA And Lateral; Future    Dehydration        - Patient is generally hypertensive. Did not take BP medication this am. Presents with low-normal BP. Diaper dry. Unable to make urine.         - Patient instructed to ensure hydration throughtout the day even with small sips.     Urinary incontinence, unspecified type    Chronic urinary tract infection         - On chronic macrobid. Unable to get specimen. Treating with levaquin for presumptive UTI.         -     levoFLOXacin (LEVAQUIN) 500 MG tablet; Take 1 tablet (500 mg total) by mouth once daily.    Uncontrolled type 2 diabetes mellitus with  complication, without long-term current use of insulin          - Patient due for routine lab work at this [facility].    Hypertension, unspecified type    Fatigue, unspecified type         -     levoFLOXacin (LEVAQUIN) 500 MG tablet; Take 1 tablet (500 mg total) by mouth once daily.          - Instructed family that if fatigue or confusion worsens they may want to consider going to the ER for cath and adequate specimen.           Follow-up: Follow-up in about 2 weeks (around 2/23/2018).

## 2018-03-01 ENCOUNTER — OFFICE VISIT (OUTPATIENT)
Dept: PRIMARY CARE CLINIC | Facility: CLINIC | Age: 83
End: 2018-03-01
Payer: MEDICARE

## 2018-03-01 VITALS
RESPIRATION RATE: 18 BRPM | TEMPERATURE: 99 F | SYSTOLIC BLOOD PRESSURE: 153 MMHG | DIASTOLIC BLOOD PRESSURE: 80 MMHG | OXYGEN SATURATION: 93 % | HEART RATE: 88 BPM

## 2018-03-01 DIAGNOSIS — M19.90 OSTEOARTHRITIS, UNSPECIFIED OSTEOARTHRITIS TYPE, UNSPECIFIED SITE: ICD-10-CM

## 2018-03-01 DIAGNOSIS — L89.152 SACRAL DECUBITUS ULCER, STAGE II: ICD-10-CM

## 2018-03-01 DIAGNOSIS — B37.2 INTERTRIGINOUS CANDIDIASIS: ICD-10-CM

## 2018-03-01 DIAGNOSIS — N30.00 ACUTE CYSTITIS WITHOUT HEMATURIA: Primary | ICD-10-CM

## 2018-03-01 DIAGNOSIS — L57.0 ACTINIC KERATOSIS: ICD-10-CM

## 2018-03-01 PROCEDURE — 99213 OFFICE O/P EST LOW 20 MIN: CPT | Mod: 25,S$GLB,, | Performed by: INTERNAL MEDICINE

## 2018-03-01 PROCEDURE — 99999 PR PBB SHADOW E&M-EST. PATIENT-LVL III: CPT | Mod: PBBFAC,,, | Performed by: INTERNAL MEDICINE

## 2018-03-01 PROCEDURE — 96372 THER/PROPH/DIAG INJ SC/IM: CPT | Mod: S$GLB,,, | Performed by: INTERNAL MEDICINE

## 2018-03-01 RX ORDER — BETAMETHASONE SODIUM PHOSPHATE AND BETAMETHASONE ACETATE 3; 3 MG/ML; MG/ML
12 INJECTION, SUSPENSION INTRA-ARTICULAR; INTRALESIONAL; INTRAMUSCULAR; SOFT TISSUE
Status: COMPLETED | OUTPATIENT
Start: 2018-03-01 | End: 2018-03-01

## 2018-03-01 RX ORDER — CIPROFLOXACIN 500 MG/1
500 TABLET ORAL EVERY 12 HOURS
Qty: 20 TABLET | Refills: 1 | Status: SHIPPED | OUTPATIENT
Start: 2018-03-01 | End: 2018-03-11

## 2018-03-01 RX ORDER — CLOTRIMAZOLE AND BETAMETHASONE DIPROPIONATE 10; .64 MG/G; MG/G
CREAM TOPICAL 2 TIMES DAILY
Qty: 45 G | Refills: 2 | Status: SHIPPED | OUTPATIENT
Start: 2018-03-01 | End: 2018-07-17 | Stop reason: SDUPTHER

## 2018-03-01 RX ORDER — LINCOMYCIN HYDROCHLORIDE 300 MG/ML
600 INJECTION, SOLUTION INTRAMUSCULAR; INTRAVENOUS; SUBCONJUNCTIVAL
Status: COMPLETED | OUTPATIENT
Start: 2018-03-01 | End: 2018-03-01

## 2018-03-01 RX ADMIN — LINCOMYCIN HYDROCHLORIDE 600 MG: 300 INJECTION, SOLUTION INTRAMUSCULAR; INTRAVENOUS; SUBCONJUNCTIVAL at 05:03

## 2018-03-01 RX ADMIN — BETAMETHASONE SODIUM PHOSPHATE AND BETAMETHASONE ACETATE 12 MG: 3; 3 INJECTION, SUSPENSION INTRA-ARTICULAR; INTRALESIONAL; INTRAMUSCULAR; SOFT TISSUE at 05:03

## 2018-03-01 NOTE — PROGRESS NOTES
Patient ID by name and . Allergies verified. Celestone 12 mg IM injection given in right ventrogluteal and Lincocin 600 mg IM injection given in left ventrogluteal using aseptic technique. Aspirated with no blood noted. Patient tolerated well. Given per physicians order. No adverse reactions noted.

## 2018-03-02 NOTE — PROGRESS NOTES
Subjective:       Patient ID: Hedy Pierre is a 89 y.o. female.    Chief Complaint: Leg Pain (C/O right lef pain) and HH Order (Requesting HH order for an aid to come help @ home and skilled nursing along with PT)    HPI  Pt c/o arthritis knees with pain and still ha snot had H/H yet live with grand daughter need assisstant with decubitus care ADL hard to get around and recurrent UTI skin rash from poor hygene and bilateral shoulder pain from trying to get up  From chair  Review of Systems    Objective:      Physical Exam   Constitutional: She is oriented to person, place, and time. She appears well-developed and well-nourished. No distress.   HENT:   Head: Normocephalic and atraumatic.   Right Ear: External ear normal.   Left Ear: External ear normal.   Nose: Nose normal.   Mouth/Throat: Oropharynx is clear and moist. No oropharyngeal exudate.   Eyes: Conjunctivae and EOM are normal. Pupils are equal, round, and reactive to light. Right eye exhibits no discharge. Left eye exhibits no discharge.   Neck: Normal range of motion. Neck supple. No thyromegaly present.   Cardiovascular: Normal rate, regular rhythm, normal heart sounds and intact distal pulses.  Exam reveals no gallop and no friction rub.    No murmur heard.  Pulmonary/Chest: Effort normal and breath sounds normal. No respiratory distress. She has no wheezes. She has no rales. She exhibits no tenderness.   Abdominal: Soft. Bowel sounds are normal. She exhibits no distension. There is no tenderness. There is no rebound and no guarding.   Musculoskeletal: Normal range of motion. She exhibits tenderness (bilaterla shoulders and knee pain). She exhibits no edema or deformity.   Lymphadenopathy:     She has no cervical adenopathy.   Neurological: She is alert and oriented to person, place, and time.   Skin: Skin is warm and dry. Capillary refill takes less than 2 seconds. No rash noted. No erythema.   Multiple hyperpigmented scaly patches on skin all over  body and skin rash in groins buttock   Psychiatric: She has a normal mood and affect. Judgment and thought content normal.   Nursing note and vitals reviewed.      Assessment:       1. Acute cystitis without hematuria    2. Intertriginous candidiasis    3. Osteoarthritis, unspecified osteoarthritis type, unspecified site    4. Actinic keratosis    5. Sacral decubitus ulcer, stage II        Plan:       Acute cystitis without hematuria  -     lincomycin injection 600 mg; Inject 2 mLs (600 mg total) into the muscle one time.  -     ciprofloxacin HCl (CIPRO) 500 MG tablet; Take 1 tablet (500 mg total) by mouth every 12 (twelve) hours.  Dispense: 20 tablet; Refill: 1  -     POCT URINE DIPSTICK WITHOUT MICROSCOPE  -     POCT URINE DIPSTICK WITHOUT MICROSCOPE    Intertriginous candidiasis  -     clotrimazole-betamethasone 1-0.05% (LOTRISONE) cream; Apply topically 2 (two) times daily.  Dispense: 45 g; Refill: 2  -     Ambulatory referral to Home Health    Osteoarthritis, unspecified osteoarthritis type, unspecified site  -     betamethasone acetate-betamethasone sodium phosphate injection 12 mg; Inject 2 mLs (12 mg total) into the muscle one time.  -     Ambulatory referral to Home Health    Actinic keratosis    Sacral decubitus ulcer, stage II  Comments:  pt need H/H will try get for pt    Other orders  -     Cancel: Ambulatory referral to Home Health

## 2018-03-07 ENCOUNTER — TELEPHONE (OUTPATIENT)
Dept: PRIMARY CARE CLINIC | Facility: CLINIC | Age: 83
End: 2018-03-07

## 2018-03-07 DIAGNOSIS — L89.152 STAGE II PRESSURE ULCER OF SACRAL REGION: Primary | ICD-10-CM

## 2018-03-07 DIAGNOSIS — B37.2 CANDIDIASIS OF SKIN: ICD-10-CM

## 2018-03-07 DIAGNOSIS — M19.90 ARTHRITIS: ICD-10-CM

## 2018-03-07 NOTE — TELEPHONE ENCOUNTER
----- Message from Jillian Gómez sent at 3/7/2018 11:18 AM CST -----  Contact: Care taker  Briana, care taker [phone],  Calling to inform you [facility], phone [phone], accepts patients insurance. They will do home health and physical therapy. Please advise. Thanks.

## 2018-03-19 ENCOUNTER — OFFICE VISIT (OUTPATIENT)
Dept: PRIMARY CARE CLINIC | Facility: CLINIC | Age: 83
End: 2018-03-19
Payer: MEDICARE

## 2018-03-19 VITALS
SYSTOLIC BLOOD PRESSURE: 115 MMHG | RESPIRATION RATE: 18 BRPM | HEART RATE: 88 BPM | DIASTOLIC BLOOD PRESSURE: 78 MMHG | OXYGEN SATURATION: 99 % | TEMPERATURE: 98 F

## 2018-03-19 DIAGNOSIS — G89.29 CHRONIC MIDLINE LOW BACK PAIN WITHOUT SCIATICA: ICD-10-CM

## 2018-03-19 DIAGNOSIS — L57.0 ACTINIC KERATOSIS: ICD-10-CM

## 2018-03-19 DIAGNOSIS — M54.50 CHRONIC MIDLINE LOW BACK PAIN WITHOUT SCIATICA: ICD-10-CM

## 2018-03-19 DIAGNOSIS — L03.119 CELLULITIS OF LOWER EXTREMITY, UNSPECIFIED LATERALITY: Primary | ICD-10-CM

## 2018-03-19 DIAGNOSIS — B37.2 INTERTRIGINOUS CANDIDIASIS: ICD-10-CM

## 2018-03-19 DIAGNOSIS — I10 ESSENTIAL HYPERTENSION: ICD-10-CM

## 2018-03-19 DIAGNOSIS — N30.00 ACUTE CYSTITIS WITHOUT HEMATURIA: ICD-10-CM

## 2018-03-19 DIAGNOSIS — N39.0 CHRONIC URINARY TRACT INFECTION: ICD-10-CM

## 2018-03-19 PROCEDURE — 99999 PR PBB SHADOW E&M-EST. PATIENT-LVL III: CPT | Mod: PBBFAC,,, | Performed by: INTERNAL MEDICINE

## 2018-03-19 PROCEDURE — 96372 THER/PROPH/DIAG INJ SC/IM: CPT | Mod: S$GLB,,, | Performed by: INTERNAL MEDICINE

## 2018-03-19 PROCEDURE — 99213 OFFICE O/P EST LOW 20 MIN: CPT | Mod: 25,S$GLB,, | Performed by: INTERNAL MEDICINE

## 2018-03-19 RX ORDER — KETOROLAC TROMETHAMINE 30 MG/ML
30 INJECTION, SOLUTION INTRAMUSCULAR; INTRAVENOUS
Status: COMPLETED | OUTPATIENT
Start: 2018-03-19 | End: 2018-03-19

## 2018-03-19 RX ADMIN — KETOROLAC TROMETHAMINE 30 MG: 30 INJECTION, SOLUTION INTRAMUSCULAR; INTRAVENOUS at 04:03

## 2018-03-19 NOTE — PROGRESS NOTES
Patient ID by name and . Allergies verified. Toradol 30 mg IM injection given in right ventrogluteal using aseptic technique. Aspirated with no blood noted. Patient tolerated well. Given per physicians order. No adverse reaction noted.

## 2018-03-19 NOTE — PROGRESS NOTES
Subjective:       Patient ID: Hedy Pierre is a 89 y.o. female.    Chief Complaint: Follow-up (c/o needing home health for a while now. Order and referral was faxed to [facility] home [facility])    HPI  Pt is here third time this month for persistent cellulitis of lower ext and bad UTI not any bettter with po abx try get home health but not successful no one come yet family report pt with  increasing swelling  Pain redness in lower ext L>R and has UTI with strong smell urine and  semiconfused and c/o lower back pain reques injection no n/v/d no sob cp willl send pt to ER for further evaluation and prob need admission for IV abx since failed po abx as OP  Review of Systems    Objective:      Physical Exam   Constitutional: She is oriented to person, place, and time. She appears well-developed and well-nourished. No distress.   Wheel chair sever hearing loss   HENT:   Head: Normocephalic and atraumatic.   Right Ear: External ear normal.   Left Ear: External ear normal.   Nose: Nose normal.   Mouth/Throat: Oropharynx is clear and moist. No oropharyngeal exudate.   Eyes: Conjunctivae and EOM are normal. Pupils are equal, round, and reactive to light. Right eye exhibits no discharge. Left eye exhibits no discharge.   Neck: Normal range of motion. Neck supple. No thyromegaly present.   Cardiovascular: Normal rate, regular rhythm, normal heart sounds and intact distal pulses.  Exam reveals no gallop and no friction rub.    No murmur heard.  Pulmonary/Chest: Effort normal and breath sounds normal. No respiratory distress. She has no wheezes. She has no rales. She exhibits no tenderness.   Abdominal: Soft. Bowel sounds are normal. She exhibits no distension. There is no tenderness. There is no rebound and no guarding.   Musculoskeletal: Normal range of motion. She exhibits tenderness (edema erythematous lower ext L>R). She exhibits no edema or deformity.   Lymphadenopathy:     She has no cervical adenopathy.   Neurological:  She is alert and oriented to person, place, and time.   Skin: Skin is warm and dry. Capillary refill takes less than 2 seconds. No rash noted. No erythema.   Psychiatric: She has a normal mood and affect. Judgment and thought content normal.   Nursing note and vitals reviewed.      Assessment:       1. Cellulitis of lower extremity, unspecified laterality    2. Acute cystitis without hematuria    3. Uncontrolled type 2 diabetes mellitus with complication, without long-term current use of insulin    4. Essential hypertension    5. Chronic urinary tract infection    6. Chronic midline low back pain without sciatica    7. Actinic keratosis    8. Intertriginous candidiasis        Plan:       Cellulitis of lower extremity, unspecified laterality  Comments:  send to ER further evaluation prob need admission for IVabx since po abx failed immunocompromised due to DM and age    Acute cystitis without hematuria    Uncontrolled type 2 diabetes mellitus with complication, without long-term current use of insulin    Essential hypertension    Chronic urinary tract infection    Chronic midline low back pain without sciatica    Actinic keratosis    Intertriginous candidiasis

## 2018-03-23 ENCOUNTER — TELEPHONE (OUTPATIENT)
Dept: PRIMARY CARE CLINIC | Facility: CLINIC | Age: 83
End: 2018-03-23

## 2018-03-23 ENCOUNTER — OFFICE VISIT (OUTPATIENT)
Dept: PRIMARY CARE CLINIC | Facility: CLINIC | Age: 83
End: 2018-03-23
Payer: MEDICARE

## 2018-03-23 VITALS
HEART RATE: 81 BPM | DIASTOLIC BLOOD PRESSURE: 73 MMHG | TEMPERATURE: 98 F | RESPIRATION RATE: 18 BRPM | SYSTOLIC BLOOD PRESSURE: 123 MMHG | OXYGEN SATURATION: 93 %

## 2018-03-23 DIAGNOSIS — I10 ESSENTIAL HYPERTENSION: ICD-10-CM

## 2018-03-23 DIAGNOSIS — E11.9 TYPE 2 DIABETES MELLITUS WITHOUT COMPLICATION, UNSPECIFIED LONG TERM INSULIN USE STATUS: ICD-10-CM

## 2018-03-23 DIAGNOSIS — F03.90 DEMENTIA WITHOUT BEHAVIORAL DISTURBANCE, UNSPECIFIED DEMENTIA TYPE: ICD-10-CM

## 2018-03-23 DIAGNOSIS — L03.119 CELLULITIS OF LOWER EXTREMITY, UNSPECIFIED LATERALITY: Primary | ICD-10-CM

## 2018-03-23 DIAGNOSIS — R32 URINARY INCONTINENCE, UNSPECIFIED TYPE: ICD-10-CM

## 2018-03-23 DIAGNOSIS — N30.00 ACUTE CYSTITIS WITHOUT HEMATURIA: ICD-10-CM

## 2018-03-23 PROCEDURE — 99213 OFFICE O/P EST LOW 20 MIN: CPT | Mod: S$GLB,,, | Performed by: INTERNAL MEDICINE

## 2018-03-23 PROCEDURE — 99999 PR PBB SHADOW E&M-EST. PATIENT-LVL III: CPT | Mod: PBBFAC,,, | Performed by: INTERNAL MEDICINE

## 2018-03-23 RX ORDER — MUPIROCIN 20 MG/G
OINTMENT TOPICAL 2 TIMES DAILY
Qty: 22 G | Refills: 5 | Status: SHIPPED | OUTPATIENT
Start: 2018-03-23 | End: 2018-05-15

## 2018-03-23 RX ORDER — CEPHALEXIN 500 MG/1
500 TABLET ORAL 3 TIMES DAILY
Qty: 40 TABLET | Refills: 0 | Status: SHIPPED | OUTPATIENT
Start: 2018-03-23 | End: 2018-04-02

## 2018-03-23 NOTE — TELEPHONE ENCOUNTER
----- Message from Sheryl Lizama sent at 3/21/2018  1:21 PM CDT -----  Jeronimo, , Stacia, is calling medical equipment & home health orders, please call [phone] option 2 [phone]

## 2018-03-23 NOTE — TELEPHONE ENCOUNTER
----- Message from Jillian Gómez sent at 3/23/2018  2:34 PM CDT -----  Contact: bella Lopes [phone], Calling because [facility] needs clinicals faxed, [phone], attn Krystina. Please advise. Thanks.

## 2018-03-25 NOTE — PROGRESS NOTES
Subjective:       Patient ID: Hedy Pierre is a 89 y.o. female.    Chief Complaint: Foot Swelling    HPI  Pt went to ER not admitted given IV abx sent home need H/H sent to insurance no respond yet pt still with infection left leg and UTI sacral decubitus dementia  Peripheral brock OA knees LS  Review of Systems    Objective:      Physical Exam   Constitutional: She is oriented to person, place, and time. She appears well-developed and well-nourished. No distress.   HENT:   Head: Normocephalic and atraumatic.   Right Ear: External ear normal.   Left Ear: External ear normal.   Nose: Nose normal.   Mouth/Throat: Oropharynx is clear and moist. No oropharyngeal exudate.   Eyes: Conjunctivae and EOM are normal. Pupils are equal, round, and reactive to light. Right eye exhibits no discharge. Left eye exhibits no discharge.   Neck: Normal range of motion. Neck supple. No thyromegaly present.   Cardiovascular: Normal rate, regular rhythm, normal heart sounds and intact distal pulses.  Exam reveals no gallop and no friction rub.    No murmur heard.  Pulmonary/Chest: Effort normal and breath sounds normal. No respiratory distress. She has no wheezes. She has no rales. She exhibits no tenderness.   Abdominal: Soft. Bowel sounds are normal. She exhibits no distension. There is no tenderness. There is no rebound and no guarding.   Musculoskeletal: Normal range of motion. She exhibits edema (plus 2 edema lower ext L>R and celulitis left leg with erythemam tender). She exhibits no tenderness or deformity.   Lymphadenopathy:     She has no cervical adenopathy.   Neurological: She is alert and oriented to person, place, and time.   Generalized weakness need assisstance to get around   Skin: Skin is warm and dry. Capillary refill takes less than 2 seconds. No rash noted. No erythema.   Psychiatric: She has a normal mood and affect. Judgment and thought content normal.   Nursing note and vitals reviewed.      Assessment:        1. Cellulitis of lower extremity, unspecified laterality    2. Acute cystitis without hematuria    3. Dementia without behavioral disturbance, unspecified dementia type    4. Type 2 diabetes mellitus without complication, unspecified long term insulin use status    5. Essential hypertension    6. Urinary incontinence, unspecified type        Plan:       Cellulitis of lower extremity, unspecified laterality  -     cephalexin (KEFTAB) 500 mg tablet; Take 1 tablet (500 mg total) by mouth 3 (three) times daily.  Dispense: 40 tablet; Refill: 0  -     mupirocin (BACTROBAN) 2 % ointment; Apply topically 2 (two) times daily.  Dispense: 22 g; Refill: 5    Acute cystitis without hematuria    Dementia without behavioral disturbance, unspecified dementia type    Type 2 diabetes mellitus without complication, unspecified long term insulin use status    Essential hypertension    Urinary incontinence, unspecified type

## 2018-04-30 ENCOUNTER — TELEPHONE (OUTPATIENT)
Dept: PRIMARY CARE CLINIC | Facility: CLINIC | Age: 83
End: 2018-04-30

## 2018-04-30 DIAGNOSIS — L89.152 STAGE II PRESSURE ULCER OF SACRAL REGION: ICD-10-CM

## 2018-04-30 DIAGNOSIS — M54.50 CHRONIC MIDLINE LOW BACK PAIN WITHOUT SCIATICA: ICD-10-CM

## 2018-04-30 DIAGNOSIS — G89.29 CHRONIC MIDLINE LOW BACK PAIN WITHOUT SCIATICA: ICD-10-CM

## 2018-04-30 DIAGNOSIS — F03.90 DEMENTIA WITHOUT BEHAVIORAL DISTURBANCE, UNSPECIFIED DEMENTIA TYPE: Primary | ICD-10-CM

## 2018-04-30 NOTE — TELEPHONE ENCOUNTER
Please sign order for Hospital bed.    Also, [facility] has not contacted them in regards to home health and physical therapy going by her. Notified that I have faxed the referral again

## 2018-04-30 NOTE — TELEPHONE ENCOUNTER
----- Message from Jillian Gómez sent at 4/30/2018  1:57 PM CDT -----  Contact: Care taker  Gracy, care taker [phone], Calling because Hospice is finished with patient on Thursday. They are taking her hospital bed. Can you order Home Health, with physical therapy, for her and [place] [facility] [place] she [place] now comes from [facility]. Please call her. Thanks.

## 2018-05-01 ENCOUNTER — TELEPHONE (OUTPATIENT)
Dept: PRIMARY CARE CLINIC | Facility: CLINIC | Age: 83
End: 2018-05-01

## 2018-05-01 NOTE — TELEPHONE ENCOUNTER
----- Message from Duke Suresh sent at 5/1/2018 10:43 AM CDT -----  Contact: Care giver-Gracy Ga [phone]  Type: Needs Medical Advice    Who Called:  Caregiver  Symptoms (please be specific): NA  How long has patient had these symptoms:  NA  Pharmacy name and phone #:  [email or web address]. Care giver did not have the phone number.  Best Call Back Number: [phone]  Additional Information: The  Caregiver states patient need orders and diagnosis, insurance information,  faxed to [email or web address] -fax [phone]

## 2018-05-08 ENCOUNTER — TELEPHONE (OUTPATIENT)
Dept: PRIMARY CARE CLINIC | Facility: CLINIC | Age: 83
End: 2018-05-08

## 2018-05-08 NOTE — TELEPHONE ENCOUNTER
----- Message from Reianldo Green sent at 5/8/2018  9:18 AM CDT -----  Contact: [facility], Pavithra Arshad want to speak with a nurse regarding medical question please call back at [phone]

## 2018-05-09 NOTE — TELEPHONE ENCOUNTER
"Spoke with Pavithra in regards to patient. Patient recently admitted to Hospice in March and discharged sometime last week. Pavithra states "The patient overall looks good, she's weak but eating well." Pavithra is not sure of the prognosis for patient but if she needs an extension on hospice. Verbalized to Pavithra patient was advised to return at the end of April for a F/U. Pavithra states "I will call the family to let them that they need to call to make a F/U appointment with Dr. Justin. Verbalized understanding.   "

## 2018-05-15 ENCOUNTER — OFFICE VISIT (OUTPATIENT)
Dept: PRIMARY CARE CLINIC | Facility: CLINIC | Age: 83
End: 2018-05-15
Payer: MEDICARE

## 2018-05-15 VITALS
HEART RATE: 80 BPM | OXYGEN SATURATION: 94 % | DIASTOLIC BLOOD PRESSURE: 77 MMHG | TEMPERATURE: 98 F | SYSTOLIC BLOOD PRESSURE: 127 MMHG | RESPIRATION RATE: 19 BRPM

## 2018-05-15 DIAGNOSIS — B35.4 TINEA CORPORIS: ICD-10-CM

## 2018-05-15 DIAGNOSIS — E11.9 TYPE 2 DIABETES MELLITUS WITHOUT COMPLICATION, WITHOUT LONG-TERM CURRENT USE OF INSULIN: ICD-10-CM

## 2018-05-15 DIAGNOSIS — F32.A ANXIETY AND DEPRESSION: ICD-10-CM

## 2018-05-15 DIAGNOSIS — F41.9 ANXIETY AND DEPRESSION: ICD-10-CM

## 2018-05-15 DIAGNOSIS — R53.83 FATIGUE, UNSPECIFIED TYPE: ICD-10-CM

## 2018-05-15 DIAGNOSIS — G47.00 INSOMNIA, UNSPECIFIED TYPE: ICD-10-CM

## 2018-05-15 DIAGNOSIS — F03.90 DEMENTIA WITHOUT BEHAVIORAL DISTURBANCE, UNSPECIFIED DEMENTIA TYPE: ICD-10-CM

## 2018-05-15 DIAGNOSIS — I10 ESSENTIAL HYPERTENSION: ICD-10-CM

## 2018-05-15 DIAGNOSIS — L30.9 CHRONIC ECZEMA: ICD-10-CM

## 2018-05-15 DIAGNOSIS — R53.81 PHYSICAL DECONDITIONING: Primary | ICD-10-CM

## 2018-05-15 DIAGNOSIS — M17.0 PRIMARY OSTEOARTHRITIS OF BOTH KNEES: ICD-10-CM

## 2018-05-15 PROCEDURE — 96372 THER/PROPH/DIAG INJ SC/IM: CPT | Mod: GW,S$GLB,, | Performed by: INTERNAL MEDICINE

## 2018-05-15 PROCEDURE — 99999 PR PBB SHADOW E&M-EST. PATIENT-LVL III: CPT | Mod: PBBFAC,,, | Performed by: INTERNAL MEDICINE

## 2018-05-15 PROCEDURE — 99213 OFFICE O/P EST LOW 20 MIN: CPT | Mod: 25,GW,S$GLB, | Performed by: INTERNAL MEDICINE

## 2018-05-15 RX ORDER — TRIAMCINOLONE ACETONIDE 1 MG/G
CREAM TOPICAL 2 TIMES DAILY
Qty: 453 G | Refills: 1 | Status: SHIPPED | OUTPATIENT
Start: 2018-05-15 | End: 2018-07-17

## 2018-05-15 RX ORDER — METFORMIN HYDROCHLORIDE 500 MG/1
500 TABLET ORAL 2 TIMES DAILY WITH MEALS
Qty: 180 TABLET | Refills: 3 | Status: SHIPPED | OUTPATIENT
Start: 2018-05-15 | End: 2019-01-25 | Stop reason: SDUPTHER

## 2018-05-15 RX ORDER — BETAMETHASONE SODIUM PHOSPHATE AND BETAMETHASONE ACETATE 3; 3 MG/ML; MG/ML
12 INJECTION, SUSPENSION INTRA-ARTICULAR; INTRALESIONAL; INTRAMUSCULAR; SOFT TISSUE
Status: COMPLETED | OUTPATIENT
Start: 2018-05-15 | End: 2018-05-15

## 2018-05-15 RX ORDER — CYANOCOBALAMIN 1000 UG/ML
1000 INJECTION, SOLUTION INTRAMUSCULAR; SUBCUTANEOUS
Status: COMPLETED | OUTPATIENT
Start: 2018-05-15 | End: 2018-05-15

## 2018-05-15 RX ORDER — MIRTAZAPINE 15 MG/1
15 TABLET, FILM COATED ORAL NIGHTLY
Qty: 90 TABLET | Refills: 3 | Status: SHIPPED | OUTPATIENT
Start: 2018-05-15 | End: 2019-01-25 | Stop reason: SDUPTHER

## 2018-05-15 RX ORDER — CLOTRIMAZOLE AND BETAMETHASONE DIPROPIONATE 10; .64 MG/G; MG/G
CREAM TOPICAL 2 TIMES DAILY
Qty: 45 G | Refills: 2 | Status: CANCELLED | OUTPATIENT
Start: 2018-05-15

## 2018-05-15 RX ORDER — GLIMEPIRIDE 4 MG/1
TABLET ORAL
Qty: 90 TABLET | Refills: 3 | Status: SHIPPED | OUTPATIENT
Start: 2018-05-15 | End: 2019-01-25 | Stop reason: SDUPTHER

## 2018-05-15 RX ORDER — AMLODIPINE BESYLATE 5 MG/1
5 TABLET ORAL DAILY
Qty: 90 TABLET | Refills: 3 | Status: SHIPPED | OUTPATIENT
Start: 2018-05-15 | End: 2019-01-25 | Stop reason: SDUPTHER

## 2018-05-15 RX ADMIN — BETAMETHASONE SODIUM PHOSPHATE AND BETAMETHASONE ACETATE 12 MG: 3; 3 INJECTION, SUSPENSION INTRA-ARTICULAR; INTRALESIONAL; INTRAMUSCULAR; SOFT TISSUE at 09:05

## 2018-05-15 RX ADMIN — CYANOCOBALAMIN 1000 MCG: 1000 INJECTION, SOLUTION INTRAMUSCULAR; SUBCUTANEOUS at 09:05

## 2018-05-15 NOTE — PROGRESS NOTES
Patient ID by name and  per granddaughter. Allergies verified. Celestone 12 mg IM injection and B12 1,000 mcg IM injection given in right ventrogluteal using aseptic technique. Aspirated with no blood noted. Patient tolerated well. Given per physicians order. No adverse reactions noted. No adverse reactions noted.

## 2018-05-16 NOTE — PROGRESS NOTES
Subjective:       Patient ID: Hedy Pierre is a 89 y.o. female.    Chief Complaint: Follow-up (was on hospice, was told to follow up for a recheck and updated report from MD.)    HPI  Pt is harder to get around required 100% assisstance from care giver at home c/o skin rash groins urine incontinence back pain knee pain dementis DM   Review of Systems    Objective:      Physical Exam    Assessment:       1. Physical deconditioning    2. Tinea corporis    3. Anxiety and depression    4. Insomnia, unspecified type    5. Type 2 diabetes mellitus without complication, without long-term current use of insulin    6. Essential hypertension    7. Dementia without behavioral disturbance, unspecified dementia type    8. Primary osteoarthritis of both knees    9. Fatigue, unspecified type    10. Chronic eczema        Plan:       Physical deconditioning  Comments:  continue H/H family request hospice evaluation had it before but previous hospice not seeing pt any more    Tinea corporis    Anxiety and depression    Insomnia, unspecified type  -     mirtazapine (REMERON) 15 MG tablet; Take 1 tablet (15 mg total) by mouth every evening.  Dispense: 90 tablet; Refill: 3    Type 2 diabetes mellitus without complication, without long-term current use of insulin  -     metFORMIN (GLUCOPHAGE) 500 MG tablet; Take 1 tablet (500 mg total) by mouth 2 (two) times daily with meals.  Dispense: 180 tablet; Refill: 3  -     glimepiride (AMARYL) 4 MG tablet; TAKE 1 TABLET ONE TIME DAILY WITH BREAKFAST OR THE FIRST MAIN MEAL OF THE DAY  Dispense: 90 tablet; Refill: 3  -     blood sugar diagnostic (BLOOD GLUCOSE TEST) Strp; 1 strip by Misc.(Non-Drug; Combo Route) route 2 (two) times daily.  Dispense: 200 strip; Refill: 3    Essential hypertension  -     amLODIPine (NORVASC) 5 MG tablet; Take 1 tablet (5 mg total) by mouth once daily.  Dispense: 90 tablet; Refill: 3    Dementia without behavioral disturbance, unspecified dementia  type    Primary osteoarthritis of both knees  -     betamethasone acetate-betamethasone sodium phosphate injection 12 mg; Inject 2 mLs (12 mg total) into the muscle one time.    Fatigue, unspecified type  -     cyanocobalamin injection 1,000 mcg; Inject 1 mL (1,000 mcg total) into the muscle one time.    Chronic eczema  -     triamcinolone acetonide 0.1% (KENALOG) 0.1 % cream; Apply topically 2 (two) times daily.  Dispense: 453 g; Refill: 1    Other orders  -     Cancel: clotrimazole-betamethasone 1-0.05% (LOTRISONE) cream; Apply topically 2 (two) times daily.  Dispense: 45 g; Refill: 2

## 2018-07-17 ENCOUNTER — OFFICE VISIT (OUTPATIENT)
Dept: PRIMARY CARE CLINIC | Facility: CLINIC | Age: 83
End: 2018-07-17
Payer: MEDICARE

## 2018-07-17 VITALS
SYSTOLIC BLOOD PRESSURE: 123 MMHG | RESPIRATION RATE: 19 BRPM | TEMPERATURE: 99 F | HEIGHT: 65 IN | OXYGEN SATURATION: 96 % | DIASTOLIC BLOOD PRESSURE: 81 MMHG | HEART RATE: 69 BPM

## 2018-07-17 DIAGNOSIS — J01.90 ACUTE NON-RECURRENT SINUSITIS, UNSPECIFIED LOCATION: Primary | ICD-10-CM

## 2018-07-17 DIAGNOSIS — L98.9 SKIN LESIONS: ICD-10-CM

## 2018-07-17 DIAGNOSIS — B37.2 INTERTRIGINOUS CANDIDIASIS: ICD-10-CM

## 2018-07-17 DIAGNOSIS — J02.9 PHARYNGITIS, UNSPECIFIED ETIOLOGY: ICD-10-CM

## 2018-07-17 PROCEDURE — 96372 THER/PROPH/DIAG INJ SC/IM: CPT | Mod: S$GLB,,, | Performed by: INTERNAL MEDICINE

## 2018-07-17 PROCEDURE — 99999 PR PBB SHADOW E&M-EST. PATIENT-LVL III: CPT | Mod: PBBFAC,,, | Performed by: INTERNAL MEDICINE

## 2018-07-17 PROCEDURE — 99213 OFFICE O/P EST LOW 20 MIN: CPT | Mod: 25,S$GLB,, | Performed by: INTERNAL MEDICINE

## 2018-07-17 RX ORDER — BETAMETHASONE SODIUM PHOSPHATE AND BETAMETHASONE ACETATE 3; 3 MG/ML; MG/ML
12 INJECTION, SUSPENSION INTRA-ARTICULAR; INTRALESIONAL; INTRAMUSCULAR; SOFT TISSUE
Status: COMPLETED | OUTPATIENT
Start: 2018-07-17 | End: 2018-07-17

## 2018-07-17 RX ORDER — AZITHROMYCIN 250 MG/1
TABLET, FILM COATED ORAL
Qty: 6 TABLET | Refills: 0 | Status: SHIPPED | OUTPATIENT
Start: 2018-07-17 | End: 2018-07-22

## 2018-07-17 RX ORDER — LINCOMYCIN HYDROCHLORIDE 300 MG/ML
600 INJECTION, SOLUTION INTRAMUSCULAR; INTRAVENOUS; SUBCONJUNCTIVAL
Status: COMPLETED | OUTPATIENT
Start: 2018-07-17 | End: 2018-07-17

## 2018-07-17 RX ORDER — CLOTRIMAZOLE AND BETAMETHASONE DIPROPIONATE 10; .64 MG/G; MG/G
CREAM TOPICAL 2 TIMES DAILY
Qty: 45 G | Refills: 2 | Status: SHIPPED | OUTPATIENT
Start: 2018-07-17 | End: 2020-03-09 | Stop reason: SDUPTHER

## 2018-07-17 RX ORDER — BETAMETHASONE SODIUM PHOSPHATE AND BETAMETHASONE ACETATE 3; 3 MG/ML; MG/ML
6 INJECTION, SUSPENSION INTRA-ARTICULAR; INTRALESIONAL; INTRAMUSCULAR; SOFT TISSUE
Status: DISCONTINUED | OUTPATIENT
Start: 2018-07-17 | End: 2018-07-17

## 2018-07-17 RX ADMIN — LINCOMYCIN HYDROCHLORIDE 600 MG: 300 INJECTION, SOLUTION INTRAMUSCULAR; INTRAVENOUS; SUBCONJUNCTIVAL at 11:07

## 2018-07-17 RX ADMIN — BETAMETHASONE SODIUM PHOSPHATE AND BETAMETHASONE ACETATE 12 MG: 3; 3 INJECTION, SUSPENSION INTRA-ARTICULAR; INTRALESIONAL; INTRAMUSCULAR; SOFT TISSUE at 11:07

## 2018-07-17 NOTE — PROGRESS NOTES
Pt ID verified by  and Name. Allergies verified. Celestone 12mg administered IM to R VG and Lincocin 600mg administered IM to L VG per MD order. No adverse reactions noted. Pt tolerated well.

## 2018-07-17 NOTE — PROGRESS NOTES
Subjective:       Patient ID: Hedy Pierre is a 89 y.o. female.    Chief Complaint: Sore Throat and Follow-up    HPI  Pt  with coughing congestion sorethroat more than a week not better no high fever throat hurt more on rt no n/v/d and skin rash under breasts R>L  Review of Systems    Objective:      Physical Exam   Constitutional: She is oriented to person, place, and time. She appears well-developed and well-nourished. No distress.   HENT:   Head: Normocephalic and atraumatic.   Right Ear: External ear normal.   Left Ear: External ear normal.   Nose: Nose normal.   Mouth/Throat: No oropharyngeal exudate.   Throat erythematous rt tonsil   Eyes: Conjunctivae and EOM are normal. Pupils are equal, round, and reactive to light. Right eye exhibits no discharge. Left eye exhibits no discharge.   Neck: Normal range of motion. Neck supple. No thyromegaly present.   Cardiovascular: Normal rate, regular rhythm, normal heart sounds and intact distal pulses.  Exam reveals no gallop and no friction rub.    No murmur heard.  Pulmonary/Chest: Effort normal and breath sounds normal. No respiratory distress. She has no wheezes. She has no rales. She exhibits no tenderness.   Abdominal: Soft. Bowel sounds are normal. She exhibits no distension. There is no tenderness. There is no rebound and no guarding.   Musculoskeletal: Normal range of motion. She exhibits no edema, tenderness or deformity.   Lymphadenopathy:     She has no cervical adenopathy.   Neurological: She is alert and oriented to person, place, and time.   Skin: Skin is warm and dry. Capillary refill takes less than 2 seconds. No rash noted. No erythema.   Erythematous rash underbreast R>L   Psychiatric: She has a normal mood and affect. Judgment and thought content normal.   Nursing note and vitals reviewed.      Assessment:       1. Acute non-recurrent sinusitis, unspecified location    2. Intertriginous candidiasis    3. Pharyngitis, unspecified etiology    4.  Skin lesions        Plan:       Acute non-recurrent sinusitis, unspecified location  -     betamethasone acetate-betamethasone sodium phosphate injection 6 mg; Inject 1 mL (6 mg total) into the muscle one time.  -     lincomycin injection 600 mg; Inject 2 mLs (600 mg total) into the muscle one time.  -     betamethasone acetate-betamethasone sodium phosphate injection 12 mg; Inject 2 mLs (12 mg total) into the muscle one time.    Intertriginous candidiasis  -     clotrimazole-betamethasone 1-0.05% (LOTRISONE) cream; Apply topically 2 (two) times daily.  Dispense: 45 g; Refill: 2    Pharyngitis, unspecified etiology  -     azithromycin (Z-MACY) 250 MG tablet; Take 2 tablets by mouth on day 1; Take 1 tablet by mouth on days 2-5  Dispense: 6 tablet; Refill: 0    Skin lesions  -     Ambulatory referral to Dermatology

## 2018-08-17 ENCOUNTER — OFFICE VISIT (OUTPATIENT)
Dept: PRIMARY CARE CLINIC | Facility: CLINIC | Age: 83
End: 2018-08-17
Payer: MEDICARE

## 2018-08-17 VITALS
HEART RATE: 88 BPM | RESPIRATION RATE: 18 BRPM | TEMPERATURE: 98 F | SYSTOLIC BLOOD PRESSURE: 138 MMHG | BODY MASS INDEX: 31.62 KG/M2 | OXYGEN SATURATION: 96 % | HEIGHT: 65 IN | DIASTOLIC BLOOD PRESSURE: 73 MMHG

## 2018-08-17 DIAGNOSIS — J06.9 UPPER RESPIRATORY TRACT INFECTION, UNSPECIFIED TYPE: ICD-10-CM

## 2018-08-17 DIAGNOSIS — S81.819A SKIN TEAR OF LOWER LEG WITHOUT COMPLICATION, INITIAL ENCOUNTER: Primary | ICD-10-CM

## 2018-08-17 PROCEDURE — 99999 PR PBB SHADOW E&M-EST. PATIENT-LVL IV: CPT | Mod: PBBFAC,,, | Performed by: INTERNAL MEDICINE

## 2018-08-17 PROCEDURE — 99213 OFFICE O/P EST LOW 20 MIN: CPT | Mod: 25,S$GLB,, | Performed by: INTERNAL MEDICINE

## 2018-08-17 PROCEDURE — 96372 THER/PROPH/DIAG INJ SC/IM: CPT | Mod: S$GLB,,, | Performed by: INTERNAL MEDICINE

## 2018-08-17 RX ORDER — CYANOCOBALAMIN 1000 UG/ML
1000 INJECTION, SOLUTION INTRAMUSCULAR; SUBCUTANEOUS
Status: COMPLETED | OUTPATIENT
Start: 2018-08-17 | End: 2018-08-17

## 2018-08-17 RX ORDER — MUPIROCIN 20 MG/G
OINTMENT TOPICAL 3 TIMES DAILY
Qty: 22 G | Refills: 2 | Status: SHIPPED | OUTPATIENT
Start: 2018-08-17 | End: 2018-12-29

## 2018-08-17 RX ORDER — MINOCYCLINE HYDROCHLORIDE 100 MG/1
100 CAPSULE ORAL 2 TIMES DAILY
Qty: 20 CAPSULE | Refills: 0 | Status: SHIPPED | OUTPATIENT
Start: 2018-08-17 | End: 2018-08-27

## 2018-08-17 RX ORDER — LINCOMYCIN HYDROCHLORIDE 300 MG/ML
600 INJECTION, SOLUTION INTRAMUSCULAR; INTRAVENOUS; SUBCONJUNCTIVAL
Status: COMPLETED | OUTPATIENT
Start: 2018-08-17 | End: 2018-08-17

## 2018-08-17 RX ORDER — BETAMETHASONE SODIUM PHOSPHATE AND BETAMETHASONE ACETATE 3; 3 MG/ML; MG/ML
12 INJECTION, SUSPENSION INTRA-ARTICULAR; INTRALESIONAL; INTRAMUSCULAR; SOFT TISSUE
Status: COMPLETED | OUTPATIENT
Start: 2018-08-17 | End: 2018-08-17

## 2018-08-17 RX ADMIN — LINCOMYCIN HYDROCHLORIDE 600 MG: 300 INJECTION, SOLUTION INTRAMUSCULAR; INTRAVENOUS; SUBCONJUNCTIVAL at 03:08

## 2018-08-17 RX ADMIN — CYANOCOBALAMIN 1000 MCG: 1000 INJECTION, SOLUTION INTRAMUSCULAR; SUBCUTANEOUS at 03:08

## 2018-08-17 RX ADMIN — BETAMETHASONE SODIUM PHOSPHATE AND BETAMETHASONE ACETATE 12 MG: 3; 3 INJECTION, SUSPENSION INTRA-ARTICULAR; INTRALESIONAL; INTRAMUSCULAR; SOFT TISSUE at 03:08

## 2018-08-17 NOTE — PROGRESS NOTES
Pt ID verified by  and Name. Allergies verified. Celestone 12mg with 1cc B12 adminsitered IM to R VG and Lincocin 600mg administered IM to L VG per MD order. No adverse reactions noted. Wound to LLE dressed with non adherent gauze and wrapped with coban per MD. Pt tolerated well.

## 2018-08-18 NOTE — PROGRESS NOTES
Subjective:       Patient ID: Hedy Pierre is a 89 y.o. female.    Chief Complaint: skintear/wounds on left lower leg (wants order for hospital bed )    HPI  Pt here with family pt totally dependent on family for her daily living activity pt  Left leg got caught in the bed rail tear skin and got infected non fever chill sob po diet good BM is normal nonambulatory  Also having cough congestion request injections  Review of Systems    Objective:      Physical Exam   Constitutional: She is oriented to person, place, and time. She appears well-developed and well-nourished. No distress.   Wheel chair   HENT:   Head: Normocephalic and atraumatic.   Right Ear: External ear normal.   Left Ear: External ear normal.   Nose: Nose normal.   Mouth/Throat: Oropharynx is clear and moist. No oropharyngeal exudate.   Eyes: Conjunctivae and EOM are normal. Pupils are equal, round, and reactive to light. Right eye exhibits no discharge. Left eye exhibits no discharge.   Neck: Normal range of motion. Neck supple. No thyromegaly present.   Cardiovascular: Normal rate, regular rhythm, normal heart sounds and intact distal pulses. Exam reveals no gallop and no friction rub.   No murmur heard.  Pulmonary/Chest: Effort normal and breath sounds normal. No respiratory distress. She has no wheezes. She has no rales. She exhibits no tenderness.   Abdominal: Soft. Bowel sounds are normal. She exhibits no distension. There is no tenderness. There is no rebound and no guarding.   Musculoskeletal: Normal range of motion. She exhibits no edema, tenderness or deformity.   Lymphadenopathy:     She has no cervical adenopathy.   Neurological: She is alert and oriented to person, place, and time.   Skin: Skin is warm and dry. Capillary refill takes less than 2 seconds. No rash noted. No erythema.   Left leg with ski tear 3cm with seperation from subcutaneous tissue with light yellow exudate and mild erythema plus 2 edema lower ext bilaterally    Psychiatric: She has a normal mood and affect. Judgment and thought content normal.   Nursing note and vitals reviewed.      Assessment:       1. Skin tear of lower leg without complication, initial encounter    2. Upper respiratory tract infection, unspecified type        Plan:       Skin tear of lower leg without complication, initial encounter  -     lincomycin injection 600 mg; Inject 2 mLs (600 mg total) into the muscle one time.  -     mupirocin (BACTROBAN) 2 % ointment; Apply topically 3 (three) times daily.  Dispense: 22 g; Refill: 2  -     minocycline (MINOCIN,DYNACIN) 100 MG capsule; Take 1 capsule (100 mg total) by mouth 2 (two) times daily. for 10 days  Dispense: 20 capsule; Refill: 0    Upper respiratory tract infection, unspecified type  -     betamethasone acetate-betamethasone sodium phosphate injection 12 mg; Inject 2 mLs (12 mg total) into the muscle one time.  -     cyanocobalamin injection 1,000 mcg; Inject 1 mL (1,000 mcg total) into the muscle one time.

## 2018-08-27 ENCOUNTER — TELEPHONE (OUTPATIENT)
Dept: PRIMARY CARE CLINIC | Facility: CLINIC | Age: 83
End: 2018-08-27

## 2018-08-27 DIAGNOSIS — E11.9 TYPE 2 DIABETES MELLITUS WITHOUT COMPLICATION, WITHOUT LONG-TERM CURRENT USE OF INSULIN: ICD-10-CM

## 2018-08-27 DIAGNOSIS — B37.2 INTERTRIGINOUS CANDIDIASIS: ICD-10-CM

## 2018-08-27 DIAGNOSIS — S81.819A SKIN TEAR OF LOWER LEG WITHOUT COMPLICATION, INITIAL ENCOUNTER: Primary | ICD-10-CM

## 2018-08-27 NOTE — TELEPHONE ENCOUNTER
----- Message from Jillian Gómez sent at 8/27/2018  9:56 AM CDT -----  Contact: Daughter-in-law  Type: Needs Medical Advice    Who Called:  Clara daughter-in-law  Symptoms (please be specific):  N/A  How long has patient had these symptoms:  N/A  Pharmacy name and phone #:  N/A  Best Call Back Number: [phone]  Additional Information: Calling because she needs help with her mother-in-law. She is refusing any type of help or care. Please call her. Thanks.

## 2018-08-29 ENCOUNTER — TELEPHONE (OUTPATIENT)
Dept: PRIMARY CARE CLINIC | Facility: CLINIC | Age: 83
End: 2018-08-29

## 2018-08-29 NOTE — TELEPHONE ENCOUNTER
----- Message from Jennie Chandra sent at 8/29/2018 11:01 AM CDT -----  Contact: Bindu  Type: Needs Medical Advice    Who Called:  Bindu with [facility]  Symptoms (please be specific):    How long has patient had these symptoms:    Pharmacy name and phone #:    Best Call Back Number: [phone]  Additional Information: called regarding referral cannot accept patient's [employer] insurance

## 2018-08-29 NOTE — TELEPHONE ENCOUNTER
----- Message from Jillian Gómez sent at 8/29/2018 10:11 AM CDT -----  Contact: Bindu with [facility] phone [phone] fax [phone]  Bindu with [facility] phone [phone] fax [phone], Calling because she needs demographic, insurance information and history & physical faxed to them. Please advise. Thanks.

## 2018-09-13 ENCOUNTER — TELEPHONE (OUTPATIENT)
Dept: PRIMARY CARE CLINIC | Facility: CLINIC | Age: 83
End: 2018-09-13
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
## 2018-09-18 ENCOUNTER — TELEPHONE (OUTPATIENT)
Dept: PRIMARY CARE CLINIC | Facility: CLINIC | Age: 83
End: 2018-09-18

## 2018-09-18 NOTE — TELEPHONE ENCOUNTER
----- Message from Reinaldo Green sent at 9/18/2018  8:17 AM CDT -----  Contact: Clara Smith want to speak with a nurse regarding regarding update on patient condition, please call back at  [phone]

## 2018-09-18 NOTE — TELEPHONE ENCOUNTER
Spoke with Clara ( daughter in law ) states that patient is giving her a difficult time trying to take her medications, take a shower, etc.  Would like to speak with a  I advised Clara to call patients insurance to see if they have one that they suggest through insurance. States understanding needs someone to talk to in regards to patient

## 2018-10-19 ENCOUNTER — TELEPHONE (OUTPATIENT)
Dept: PRIMARY CARE CLINIC | Facility: CLINIC | Age: 83
End: 2018-10-19

## 2018-10-19 DIAGNOSIS — Z74.09 UNABLE TO TRANSFER FROM WHEELCHAIR TO CHAIR: ICD-10-CM

## 2018-10-19 DIAGNOSIS — B37.2 INTERTRIGINOUS CANDIDIASIS: ICD-10-CM

## 2018-10-19 DIAGNOSIS — R29.898 SEVERE MUSCLE DECONDITIONING: ICD-10-CM

## 2018-10-19 DIAGNOSIS — S81.819A SKIN TEAR OF LOWER LEG WITHOUT COMPLICATION, INITIAL ENCOUNTER: Primary | ICD-10-CM

## 2018-10-19 NOTE — TELEPHONE ENCOUNTER
Spoke with Clara, states that patient needs to come in to see the doctor however, needs transportation to get in. Notified Clara to speak with her insurance to make sure that they have those types of services that would be approved through a physician order and we will go ahead and get everything sent to the insurance company. Clara states understanding and will call me back when she finds out what to do.

## 2018-10-19 NOTE — TELEPHONE ENCOUNTER
----- Message from Rudolph Reynoso sent at 10/19/2018 11:00 AM CDT -----  Contact: pt daughter in law Clara Elizabeth states the pt is unable to get the pt to the office for a visit due to personal limitations. Clara states the pts skin is really thin and cracking and bleeding due to the thinness. She is asking for advice for transportation that can accommodate a wheelchair or a home visit.    Call Back #: [phone]  Thanks

## 2018-10-19 NOTE — TELEPHONE ENCOUNTER
----- Message from Duke Suresh sent at 10/19/2018 12:10 PM CDT -----  Type: Needs Medical Advice    Who Called:  Daughter in law Lori Symptoms (please be specific):  NA How long has patient had these symptoms:  NAPharmacy name and phone #:  NA  Best Call Back Number: [phone]  Additional Information: Daughter in law states [employer] insurance  will pay for nurse for  wound care, and pharmaceutical and equipment.    
Patient needs home health nurse, skilled nursing, wound care.  
Please sign orders for home health and wound care to go out and eval / treat  
Handoff

## 2018-10-23 ENCOUNTER — TELEPHONE (OUTPATIENT)
Dept: PRIMARY CARE CLINIC | Facility: CLINIC | Age: 83
End: 2018-10-23

## 2018-10-23 NOTE — TELEPHONE ENCOUNTER
[facility] However.Faxed referral to [facility]

## 2018-10-23 NOTE — TELEPHONE ENCOUNTER
Spoke and notified that [facility] will take over her home health and they will be contacting her. States understanding

## 2018-10-23 NOTE — TELEPHONE ENCOUNTER
----- Message from Dima Luna sent at 10/23/2018  3:38 PM CDT -----  Type: Needs Medical Advice    Who Called:  Daughter  Best Call Back Number: [phone] (home)   Additional Information: Daughter needs to call back TODAY - she has been trying for a week

## 2018-10-31 ENCOUNTER — TELEPHONE (OUTPATIENT)
Dept: PRIMARY CARE CLINIC | Facility: CLINIC | Age: 83
End: 2018-10-31

## 2018-10-31 NOTE — TELEPHONE ENCOUNTER
----- Message from Jillian Araya sent at 10/31/2018  8:41 AM CDT -----  Please call Clara [phone] / leg is black/ took the bandage off

## 2018-10-31 NOTE — TELEPHONE ENCOUNTER
----- Message from Airam Campos sent at 10/31/2018  2:54 PM CDT -----  Contact: [phone] nurse herbert with [facility]   [phone] nurse herbert with [facility]   Returning phone call   Wound care was done on the patient and she was recommended to go to ER  Patient refused to go to ER

## 2018-10-31 NOTE — TELEPHONE ENCOUNTER
----- Message from Jillian Araya sent at 10/31/2018  7:38 AM CDT -----  Has a bandage on her leg / Dr Justin advised to not take it off/ her leg is swelling and weeping/ wants to cut it off / daughter n law states the bottom of the leg / warm to touch ) call Clara [phone] (home)    [phone] ( lost patient's call)

## 2018-10-31 NOTE — TELEPHONE ENCOUNTER
Spoke with Clara, verbalized to her that Jessica can not come out today until after lunch to see the patient's leg. I advised her again to bring the patient to the ER but patient is refusing. Clara verbalized understanding but is going to wait for Jessica. Verbalized understanding.

## 2018-10-31 NOTE — TELEPHONE ENCOUNTER
"Spoke with Clara, patient's daughter in law, patient's leg is "black, swollen, and weeping" per daughter in law. Advised Clara that per Dr. Justin the patient needs to go to the ER. Clara understands but the patient is refusing. The patient would like her wound care nurse, Jessica to come and see her this morning. Verbalized to Clara that I will call Jessica now.   "

## 2018-10-31 NOTE — TELEPHONE ENCOUNTER
Spoke with Olive, Jessica is currently in a meeting until 10 AM. Jessica will not be able to go to patient's home until after lunch. Verbalized to Olive that I will call the daughter in law to let her know. Jessica will still go and see the patient this afternoon. Verbalized understanding.

## 2018-10-31 NOTE — TELEPHONE ENCOUNTER
Spoke with Clara, states that she cannot get patient out of the house. Patient is non compliant about going to the ER or coming in to see the M.D   She wanted me to let you know.  Please advise.

## 2018-11-01 ENCOUNTER — TELEPHONE (OUTPATIENT)
Dept: PRIMARY CARE CLINIC | Facility: CLINIC | Age: 83
End: 2018-11-01

## 2018-11-01 NOTE — TELEPHONE ENCOUNTER
Spoke to Clara and she stated that pt. Is now sleeping. She did eat a sandwich and let the nurse bath her. She also stated that pt. May have a uti and will try to get a urine sample .

## 2018-11-01 NOTE — TELEPHONE ENCOUNTER
----- Message from Rufina Gaytan sent at 11/1/2018  9:48 AM CDT -----  Contact: Daughter In Law Clara   Daughter in law  states patient will not get up to be changed and refusses to eat.     Patient is combative and being difficult and out of control     Daughter in law needs to speak to the nurse ASAP [phone]

## 2018-11-02 ENCOUNTER — TELEPHONE (OUTPATIENT)
Dept: PRIMARY CARE CLINIC | Facility: CLINIC | Age: 83
End: 2018-11-02

## 2018-11-02 NOTE — TELEPHONE ENCOUNTER
----- Message from Sheryl Davis sent at 11/2/2018  3:19 PM CDT -----  Type: Needs Medical Advice    Who Called: Daughter-in- law (Clara Pierre)    Best Call Back Number: [phone] and Abbie, [facility] at [phone]  Additional Information: Stating the patient has been combative, wanted to know if there orders that can be put in. May have to take the patient to the hospital tomorrow to be evaluated. Stating the patient has a lot of issues, mentally. Please advise, also stating to contact home health nurse Abbie regarding the matter first

## 2018-11-03 PROBLEM — L03.116 CELLULITIS OF LEFT LOWER EXTREMITY: Status: ACTIVE | Noted: 2018-11-03

## 2018-11-04 PROBLEM — E87.6 HYPOKALEMIA: Status: ACTIVE | Noted: 2018-11-04

## 2018-11-04 PROBLEM — E87.20 LACTIC ACIDOSIS: Status: ACTIVE | Noted: 2018-11-04

## 2018-11-06 PROBLEM — R54 FRAILTY SYNDROME IN GERIATRIC PATIENT: Status: ACTIVE | Noted: 2018-11-06

## 2018-11-07 ENCOUNTER — TELEPHONE (OUTPATIENT)
Dept: ADMINISTRATIVE | Facility: CLINIC | Age: 83
End: 2018-11-07

## 2018-11-09 PROBLEM — R10.9 ABDOMINAL PAIN: Status: ACTIVE | Noted: 2018-11-09

## 2018-12-20 ENCOUNTER — TELEPHONE (OUTPATIENT)
Dept: PRIMARY CARE CLINIC | Facility: CLINIC | Age: 83
End: 2018-12-20

## 2018-12-20 NOTE — TELEPHONE ENCOUNTER
----- Message from Jillian Araya sent at 12/20/2018  2:12 PM CST -----  Please call Daughter-in- law (Clara Pierre ) [phone] / asking for Dr to get her into an nursing home...asap  She is very combative / out of control

## 2019-01-04 ENCOUNTER — TELEPHONE (OUTPATIENT)
Dept: ADMINISTRATIVE | Facility: CLINIC | Age: 84
End: 2019-01-04

## 2019-01-25 ENCOUNTER — TELEPHONE (OUTPATIENT)
Dept: PRIMARY CARE CLINIC | Facility: CLINIC | Age: 84
End: 2019-01-25

## 2019-01-25 DIAGNOSIS — E11.9 TYPE 2 DIABETES MELLITUS WITHOUT COMPLICATION, WITHOUT LONG-TERM CURRENT USE OF INSULIN: ICD-10-CM

## 2019-01-25 DIAGNOSIS — G47.00 INSOMNIA, UNSPECIFIED TYPE: ICD-10-CM

## 2019-01-25 DIAGNOSIS — I10 ESSENTIAL HYPERTENSION: ICD-10-CM

## 2019-01-25 NOTE — TELEPHONE ENCOUNTER
Phoned pts daughter in law Clara states she needs refulls on all her meds at [facility]/[place]

## 2019-01-25 NOTE — TELEPHONE ENCOUNTER
----- Message from Vandana Carballo sent at 1/24/2019  2:05 PM CST -----  Regarding: Pt out of meds  Contact: [phone]  Patient has not seen Dr. Justin as family states they can't get her there. Patient is out of meds and Cg has concerns and wants to know what to do since she can't get patient to [facility]. [facility] has sent communication notes to [facility] with no response. We are also requesting MSW order.    Please call pt's daughter in law at [phone]. States she has been calling MD office as well with no response.    Thanks,  Vandana

## 2019-01-27 RX ORDER — MIRTAZAPINE 15 MG/1
15 TABLET, FILM COATED ORAL NIGHTLY
Qty: 90 TABLET | Refills: 3 | Status: SHIPPED | OUTPATIENT
Start: 2019-01-27 | End: 2020-03-09 | Stop reason: SDUPTHER

## 2019-01-27 RX ORDER — METFORMIN HYDROCHLORIDE 500 MG/1
500 TABLET ORAL 2 TIMES DAILY WITH MEALS
Qty: 180 TABLET | Refills: 3 | Status: SHIPPED | OUTPATIENT
Start: 2019-01-27 | End: 2020-03-09 | Stop reason: SDUPTHER

## 2019-01-27 RX ORDER — GLIMEPIRIDE 4 MG/1
TABLET ORAL
Qty: 90 TABLET | Refills: 3 | Status: SHIPPED | OUTPATIENT
Start: 2019-01-27 | End: 2020-03-09 | Stop reason: SDUPTHER

## 2019-01-27 RX ORDER — AMLODIPINE BESYLATE 5 MG/1
5 TABLET ORAL DAILY
Qty: 90 TABLET | Refills: 3 | Status: SHIPPED | OUTPATIENT
Start: 2019-01-27 | End: 2020-03-09 | Stop reason: SDUPTHER

## 2019-05-03 ENCOUNTER — TELEPHONE (OUTPATIENT)
Dept: PRIMARY CARE CLINIC | Facility: CLINIC | Age: 84
End: 2019-05-03

## 2019-05-03 NOTE — TELEPHONE ENCOUNTER
Spoke with Clara and notified that Dr Justin is off all next week.  States will call back if an appointment is still needed.

## 2019-05-03 NOTE — TELEPHONE ENCOUNTER
----- Message from Julito Wasserman sent at 5/3/2019 12:55 PM CDT -----  Contact: Daughter in Law Clara   Type:  Sooner Apoointment Request    Caller is requesting a sooner appointment.  Caller declined first available appointment listed below.  Caller will not accept being placed on the waitlist and is requesting a message be sent to doctor.    Name of Caller: Daughter in Law Elizabeth   When is the first available appointment? 5/13  Symptoms: leg pain, issues with bowel movement   Best Call Back Number: [phone] (home)   Additional Information: patient need to be seen on this Tuesday 5/7, she only want to see Dr Justin

## 2019-09-25 ENCOUNTER — TELEPHONE (OUTPATIENT)
Dept: PRIMARY CARE CLINIC | Facility: CLINIC | Age: 84
End: 2019-09-25
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
## 2020-01-01 ENCOUNTER — TELEPHONE (OUTPATIENT)
Dept: PRIMARY CARE CLINIC | Facility: CLINIC | Age: 85
End: 2020-01-01

## 2020-01-01 ENCOUNTER — OFFICE VISIT (OUTPATIENT)
Dept: PRIMARY CARE CLINIC | Facility: CLINIC | Age: 85
End: 2020-01-01
Payer: MEDICARE

## 2020-01-01 ENCOUNTER — CARE AT HOME (OUTPATIENT)
Dept: HOME HEALTH SERVICES | Facility: CLINIC | Age: 85
End: 2020-01-01
Payer: MEDICARE

## 2020-01-01 ENCOUNTER — PATIENT OUTREACH (OUTPATIENT)
Dept: ADMINISTRATIVE | Facility: CLINIC | Age: 85
End: 2020-01-01

## 2020-01-01 ENCOUNTER — PATIENT MESSAGE (OUTPATIENT)
Dept: PRIMARY CARE CLINIC | Facility: CLINIC | Age: 85
End: 2020-01-01

## 2020-01-01 ENCOUNTER — NURSE TRIAGE (OUTPATIENT)
Dept: ADMINISTRATIVE | Facility: CLINIC | Age: 85
End: 2020-01-01

## 2020-01-01 ENCOUNTER — EXTERNAL HOME HEALTH (OUTPATIENT)
Dept: HOME HEALTH SERVICES | Facility: HOSPITAL | Age: 85
End: 2020-01-01

## 2020-01-01 ENCOUNTER — EXTERNAL HOME HEALTH (OUTPATIENT)
Dept: HOME HEALTH SERVICES | Facility: HOSPITAL | Age: 85
End: 2020-01-01
Payer: MEDICARE

## 2020-01-01 VITALS
SYSTOLIC BLOOD PRESSURE: 140 MMHG | OXYGEN SATURATION: 99 % | DIASTOLIC BLOOD PRESSURE: 76 MMHG | RESPIRATION RATE: 16 BRPM | HEART RATE: 94 BPM | TEMPERATURE: 98 F

## 2020-01-01 VITALS
BODY MASS INDEX: 30.12 KG/M2 | HEART RATE: 63 BPM | DIASTOLIC BLOOD PRESSURE: 60 MMHG | TEMPERATURE: 98 F | SYSTOLIC BLOOD PRESSURE: 138 MMHG | RESPIRATION RATE: 18 BRPM | HEIGHT: 65 IN | OXYGEN SATURATION: 100 %

## 2020-01-01 DIAGNOSIS — L97.229 VENOUS STASIS ULCER OF LEFT CALF, UNSPECIFIED ULCER STAGE, UNSPECIFIED WHETHER VARICOSE VEINS PRESENT: ICD-10-CM

## 2020-01-01 DIAGNOSIS — I10 ESSENTIAL HYPERTENSION: ICD-10-CM

## 2020-01-01 DIAGNOSIS — L30.8 PRURITIC DERMATITIS: Primary | ICD-10-CM

## 2020-01-01 DIAGNOSIS — R53.83 DECREASED ENERGY: ICD-10-CM

## 2020-01-01 DIAGNOSIS — B35.6 TINEA CRURIS: ICD-10-CM

## 2020-01-01 DIAGNOSIS — M47.816 LUMBAR FACET ARTHROPATHY: ICD-10-CM

## 2020-01-01 DIAGNOSIS — R42 DIZZINESS: ICD-10-CM

## 2020-01-01 DIAGNOSIS — E87.6 HYPOKALEMIA: Primary | ICD-10-CM

## 2020-01-01 DIAGNOSIS — N30.00 ACUTE CYSTITIS WITHOUT HEMATURIA: ICD-10-CM

## 2020-01-01 DIAGNOSIS — R54 FRAILTY SYNDROME IN GERIATRIC PATIENT: ICD-10-CM

## 2020-01-01 DIAGNOSIS — I83.022 VENOUS STASIS ULCER OF LEFT CALF, UNSPECIFIED ULCER STAGE, UNSPECIFIED WHETHER VARICOSE VEINS PRESENT: ICD-10-CM

## 2020-01-01 DIAGNOSIS — I82.432 ACUTE DEEP VEIN THROMBOSIS (DVT) OF POPLITEAL VEIN OF LEFT LOWER EXTREMITY: ICD-10-CM

## 2020-01-01 DIAGNOSIS — L03.116 CELLULITIS OF LEFT LEG: Primary | ICD-10-CM

## 2020-01-01 DIAGNOSIS — N95.0 ABNORMAL VAGINAL BLEEDING IN POSTMENOPAUSAL PATIENT: Primary | ICD-10-CM

## 2020-01-01 DIAGNOSIS — L03.115 CELLULITIS OF RIGHT LOWER EXTREMITY: Primary | ICD-10-CM

## 2020-01-01 DIAGNOSIS — E11.9 TYPE 2 DIABETES MELLITUS WITHOUT COMPLICATION, WITHOUT LONG-TERM CURRENT USE OF INSULIN: ICD-10-CM

## 2020-01-01 DIAGNOSIS — L03.115 CELLULITIS OF RIGHT LOWER EXTREMITY: ICD-10-CM

## 2020-01-01 DIAGNOSIS — Z23 NEED FOR VACCINATION: ICD-10-CM

## 2020-01-01 DIAGNOSIS — N28.9 RENAL INSUFFICIENCY: ICD-10-CM

## 2020-01-01 DIAGNOSIS — M17.0 PRIMARY OSTEOARTHRITIS OF BOTH KNEES: ICD-10-CM

## 2020-01-01 DIAGNOSIS — E11.9 TYPE 2 DIABETES MELLITUS WITHOUT COMPLICATION, WITHOUT LONG-TERM CURRENT USE OF INSULIN: Primary | ICD-10-CM

## 2020-01-01 DIAGNOSIS — R54 AGE-RELATED PHYSICAL DEBILITY: Primary | ICD-10-CM

## 2020-01-01 PROCEDURE — 99214 PR OFFICE/OUTPT VISIT, EST, LEVL IV, 30-39 MIN: ICD-10-PCS | Mod: S$PBB,,, | Performed by: STUDENT IN AN ORGANIZED HEALTH CARE EDUCATION/TRAINING PROGRAM

## 2020-01-01 PROCEDURE — 99999 PR PBB SHADOW E&M-EST. PATIENT-LVL V: CPT | Mod: PBBFAC,,, | Performed by: INTERNAL MEDICINE

## 2020-01-01 PROCEDURE — 99213 OFFICE O/P EST LOW 20 MIN: CPT | Mod: 95,,, | Performed by: INTERNAL MEDICINE

## 2020-01-01 PROCEDURE — 99495 TCM SERVICES (MODERATE COMPLEXITY): ICD-10-PCS | Mod: S$GLB,,, | Performed by: NURSE PRACTITIONER

## 2020-01-01 PROCEDURE — 99999 PR PBB SHADOW E&M-EST. PATIENT-LVL V: CPT | Mod: PBBFAC,,, | Performed by: STUDENT IN AN ORGANIZED HEALTH CARE EDUCATION/TRAINING PROGRAM

## 2020-01-01 PROCEDURE — 99215 OFFICE O/P EST HI 40 MIN: CPT | Mod: PBBFAC,PN | Performed by: STUDENT IN AN ORGANIZED HEALTH CARE EDUCATION/TRAINING PROGRAM

## 2020-01-01 PROCEDURE — G0008 ADMIN INFLUENZA VIRUS VAC: HCPCS | Mod: PBBFAC,PN

## 2020-01-01 PROCEDURE — 99214 OFFICE O/P EST MOD 30 MIN: CPT | Mod: S$PBB,,, | Performed by: STUDENT IN AN ORGANIZED HEALTH CARE EDUCATION/TRAINING PROGRAM

## 2020-01-01 PROCEDURE — 99215 OFFICE O/P EST HI 40 MIN: CPT | Mod: PBBFAC,PN,25 | Performed by: INTERNAL MEDICINE

## 2020-01-01 PROCEDURE — 99497 ADVNCD CARE PLAN 30 MIN: CPT | Mod: 25,S$GLB,, | Performed by: NURSE PRACTITIONER

## 2020-01-01 PROCEDURE — 99213 PR OFFICE/OUTPT VISIT, EST, LEVL III, 20-29 MIN: ICD-10-PCS | Mod: 95,,, | Performed by: INTERNAL MEDICINE

## 2020-01-01 PROCEDURE — 96372 THER/PROPH/DIAG INJ SC/IM: CPT | Mod: PBBFAC,59,PN

## 2020-01-01 PROCEDURE — 99495 TRANSJ CARE MGMT MOD F2F 14D: CPT | Mod: S$GLB,,, | Performed by: NURSE PRACTITIONER

## 2020-01-01 PROCEDURE — 99999 PR PBB SHADOW E&M-EST. PATIENT-LVL V: ICD-10-PCS | Mod: PBBFAC,,, | Performed by: STUDENT IN AN ORGANIZED HEALTH CARE EDUCATION/TRAINING PROGRAM

## 2020-01-01 PROCEDURE — 99214 PR OFFICE/OUTPT VISIT, EST, LEVL IV, 30-39 MIN: ICD-10-PCS | Mod: S$PBB,,, | Performed by: INTERNAL MEDICINE

## 2020-01-01 PROCEDURE — 90694 VACC AIIV4 NO PRSRV 0.5ML IM: CPT | Mod: PBBFAC,PN

## 2020-01-01 PROCEDURE — 99214 OFFICE O/P EST MOD 30 MIN: CPT | Mod: S$PBB,,, | Performed by: INTERNAL MEDICINE

## 2020-01-01 PROCEDURE — G0009 ADMIN PNEUMOCOCCAL VACCINE: HCPCS | Mod: PBBFAC,PN

## 2020-01-01 PROCEDURE — 99999 PR PBB SHADOW E&M-EST. PATIENT-LVL V: ICD-10-PCS | Mod: PBBFAC,,, | Performed by: INTERNAL MEDICINE

## 2020-01-01 PROCEDURE — 99497 PR ADVNCD CARE PLAN 30 MIN: ICD-10-PCS | Mod: 25,S$GLB,, | Performed by: NURSE PRACTITIONER

## 2020-01-01 RX ORDER — TRIAMCINOLONE ACETONIDE 40 MG/ML
60 INJECTION, SUSPENSION INTRA-ARTICULAR; INTRAMUSCULAR ONCE
Status: COMPLETED | OUTPATIENT
Start: 2020-01-01 | End: 2020-01-01

## 2020-01-01 RX ORDER — MINOCYCLINE HYDROCHLORIDE 100 MG/1
100 CAPSULE ORAL 2 TIMES DAILY
Qty: 20 CAPSULE | Refills: 0 | Status: ON HOLD | OUTPATIENT
Start: 2020-01-01 | End: 2020-01-01 | Stop reason: SDUPTHER

## 2020-01-01 RX ORDER — FUROSEMIDE 40 MG/1
40 TABLET ORAL DAILY
Qty: 90 TABLET | Refills: 1 | Status: SHIPPED | OUTPATIENT
Start: 2020-01-01 | End: 2021-01-01

## 2020-01-01 RX ORDER — TRAMADOL HYDROCHLORIDE 50 MG/1
50 TABLET ORAL EVERY 12 HOURS PRN
Qty: 30 TABLET | Refills: 0 | Status: ON HOLD | OUTPATIENT
Start: 2020-01-01 | End: 2021-01-01 | Stop reason: HOSPADM

## 2020-01-01 RX ORDER — HYDROXYZINE HYDROCHLORIDE 25 MG/1
25 TABLET, FILM COATED ORAL 3 TIMES DAILY PRN
Qty: 30 TABLET | Refills: 0 | Status: SHIPPED | OUTPATIENT
Start: 2020-01-01

## 2020-01-01 RX ORDER — FUROSEMIDE 40 MG/1
40 TABLET ORAL DAILY
Qty: 30 TABLET | Refills: 1 | Status: SHIPPED | OUTPATIENT
Start: 2020-01-01 | End: 2020-01-01

## 2020-01-01 RX ORDER — ZOSTER VACCINE RECOMBINANT, ADJUVANTED 50 MCG/0.5
0.5 KIT INTRAMUSCULAR ONCE
Qty: 1 EACH | Refills: 0 | Status: SHIPPED | OUTPATIENT
Start: 2020-01-01 | End: 2020-01-01

## 2020-01-01 RX ORDER — MUPIROCIN 20 MG/G
OINTMENT TOPICAL 2 TIMES DAILY
Qty: 22 G | Refills: 3 | Status: ON HOLD | OUTPATIENT
Start: 2020-01-01 | End: 2021-01-01 | Stop reason: HOSPADM

## 2020-01-01 RX ORDER — FUROSEMIDE 40 MG/1
TABLET ORAL
Qty: 30 TABLET | Refills: 1 | Status: SHIPPED | OUTPATIENT
Start: 2020-01-01 | End: 2020-01-01 | Stop reason: SDUPTHER

## 2020-01-01 RX ORDER — CLINDAMYCIN HYDROCHLORIDE 300 MG/1
300 CAPSULE ORAL EVERY 6 HOURS
Qty: 40 CAPSULE | Refills: 0 | Status: ON HOLD | OUTPATIENT
Start: 2020-01-01 | End: 2020-01-01 | Stop reason: HOSPADM

## 2020-01-01 RX ORDER — NITROFURANTOIN 25; 75 MG/1; MG/1
100 CAPSULE ORAL 2 TIMES DAILY
Qty: 20 CAPSULE | Refills: 1 | Status: ON HOLD | OUTPATIENT
Start: 2020-01-01 | End: 2020-01-01

## 2020-01-01 RX ORDER — CLOTRIMAZOLE AND BETAMETHASONE DIPROPIONATE 10; .64 MG/G; MG/G
CREAM TOPICAL 2 TIMES DAILY
Qty: 45 G | Refills: 2 | Status: SHIPPED | OUTPATIENT
Start: 2020-01-01 | End: 2020-01-01

## 2020-01-01 RX ADMIN — TRIAMCINOLONE ACETONIDE 60 MG: 400 INJECTION, SUSPENSION INTRA-ARTICULAR; INTRAMUSCULAR at 11:11

## 2020-03-03 ENCOUNTER — TELEPHONE (OUTPATIENT)
Dept: PRIMARY CARE CLINIC | Facility: CLINIC | Age: 85
End: 2020-03-03

## 2020-03-03 NOTE — TELEPHONE ENCOUNTER
Spoke with Tia and notified that pt will need appointment to get meds.  [facility] should have given any meds that pt was on along with a list of medication.  She has appointment with Dr. Worley next week.    ----- Message from Jillian Gómez sent at 3/3/2020 10:46 AM CST -----  Contact: Granddaughter  Type: Needs Medical Advice    Who Called:  von Weber  Symptoms (please be specific):  N/A  How long has patient had these symptoms:  N/A  Ph[facility]  [place]   Best Call Back Number: [phone]  Additional Information: Calling because she was discharged for the Nursing Home with no medications. They have no medications at home for her. Please advise. Thanks.

## 2020-03-09 ENCOUNTER — OFFICE VISIT (OUTPATIENT)
Dept: PRIMARY CARE CLINIC | Facility: CLINIC | Age: 85
End: 2020-03-09
Payer: MEDICARE

## 2020-03-09 VITALS
HEART RATE: 75 BPM | OXYGEN SATURATION: 98 % | DIASTOLIC BLOOD PRESSURE: 70 MMHG | RESPIRATION RATE: 17 BRPM | SYSTOLIC BLOOD PRESSURE: 140 MMHG | TEMPERATURE: 98 F

## 2020-03-09 DIAGNOSIS — B37.2 INTERTRIGINOUS CANDIDIASIS: ICD-10-CM

## 2020-03-09 DIAGNOSIS — E11.9 TYPE 2 DIABETES MELLITUS WITHOUT COMPLICATION, WITHOUT LONG-TERM CURRENT USE OF INSULIN: ICD-10-CM

## 2020-03-09 DIAGNOSIS — G47.00 INSOMNIA, UNSPECIFIED TYPE: ICD-10-CM

## 2020-03-09 DIAGNOSIS — Z98.890 HISTORY OF HIP SURGERY: ICD-10-CM

## 2020-03-09 DIAGNOSIS — H91.90 HEARING LOSS, UNSPECIFIED HEARING LOSS TYPE, UNSPECIFIED LATERALITY: ICD-10-CM

## 2020-03-09 DIAGNOSIS — R32 URINARY INCONTINENCE, UNSPECIFIED TYPE: ICD-10-CM

## 2020-03-09 DIAGNOSIS — L22 DIAPER RASH: ICD-10-CM

## 2020-03-09 DIAGNOSIS — Z87.442 HISTORY OF NEPHROLITHIASIS: ICD-10-CM

## 2020-03-09 DIAGNOSIS — H90.6 MIXED CONDUCTIVE AND SENSORINEURAL HEARING LOSS OF BOTH EARS: ICD-10-CM

## 2020-03-09 DIAGNOSIS — R15.9 INCONTINENCE OF FECES, UNSPECIFIED FECAL INCONTINENCE TYPE: ICD-10-CM

## 2020-03-09 DIAGNOSIS — S39.012A LUMBOSACRAL STRAIN, INITIAL ENCOUNTER: Primary | ICD-10-CM

## 2020-03-09 DIAGNOSIS — I10 ESSENTIAL HYPERTENSION: ICD-10-CM

## 2020-03-09 DIAGNOSIS — R19.7 DIARRHEA, UNSPECIFIED TYPE: ICD-10-CM

## 2020-03-09 PROCEDURE — 99999 PR PBB SHADOW E&M-EST. PATIENT-LVL V: CPT | Mod: PBBFAC,,, | Performed by: FAMILY MEDICINE

## 2020-03-09 PROCEDURE — 99204 PR OFFICE/OUTPT VISIT, NEW, LEVL IV, 45-59 MIN: ICD-10-PCS | Mod: S$PBB,,, | Performed by: FAMILY MEDICINE

## 2020-03-09 PROCEDURE — 99215 OFFICE O/P EST HI 40 MIN: CPT | Mod: PBBFAC,PN | Performed by: FAMILY MEDICINE

## 2020-03-09 PROCEDURE — 99999 PR PBB SHADOW E&M-EST. PATIENT-LVL V: ICD-10-PCS | Mod: PBBFAC,,, | Performed by: FAMILY MEDICINE

## 2020-03-09 PROCEDURE — 99204 OFFICE O/P NEW MOD 45 MIN: CPT | Mod: S$PBB,,, | Performed by: FAMILY MEDICINE

## 2020-03-09 RX ORDER — AMLODIPINE BESYLATE 5 MG/1
5 TABLET ORAL DAILY
Qty: 90 TABLET | Refills: 3 | Status: ON HOLD | OUTPATIENT
Start: 2020-03-09 | End: 2021-01-01 | Stop reason: HOSPADM

## 2020-03-09 RX ORDER — LANCETS
EACH MISCELLANEOUS
Qty: 100 EACH | Refills: 5 | Status: CANCELLED | OUTPATIENT
Start: 2020-03-09

## 2020-03-09 RX ORDER — GLIMEPIRIDE 4 MG/1
TABLET ORAL
Qty: 90 TABLET | Refills: 3 | Status: ON HOLD | OUTPATIENT
Start: 2020-03-09 | End: 2020-01-01 | Stop reason: HOSPADM

## 2020-03-09 RX ORDER — ACETAMINOPHEN 325 MG/1
325 TABLET ORAL EVERY 6 HOURS PRN
COMMUNITY

## 2020-03-09 RX ORDER — MIRTAZAPINE 15 MG/1
15 TABLET, FILM COATED ORAL NIGHTLY
Qty: 90 TABLET | Refills: 3 | Status: ON HOLD | OUTPATIENT
Start: 2020-03-09 | End: 2021-01-01

## 2020-03-09 RX ORDER — METFORMIN HYDROCHLORIDE 500 MG/1
500 TABLET ORAL 2 TIMES DAILY WITH MEALS
Qty: 180 TABLET | Refills: 3 | Status: ON HOLD | OUTPATIENT
Start: 2020-03-09 | End: 2021-01-01 | Stop reason: HOSPADM

## 2020-03-09 RX ORDER — LOPERAMIDE HYDROCHLORIDE 2 MG/1
2 CAPSULE ORAL 4 TIMES DAILY PRN
COMMUNITY
End: 2020-08-05

## 2020-03-09 RX ORDER — INSULIN PUMP SYRINGE, 3 ML
EACH MISCELLANEOUS
Qty: 1 EACH | Refills: 0 | Status: CANCELLED | OUTPATIENT
Start: 2020-03-09 | End: 2021-01-01

## 2020-03-09 RX ORDER — CLOTRIMAZOLE AND BETAMETHASONE DIPROPIONATE 10; .64 MG/G; MG/G
CREAM TOPICAL 2 TIMES DAILY
Qty: 45 G | Refills: 2 | Status: ON HOLD | OUTPATIENT
Start: 2020-03-09 | End: 2020-01-01 | Stop reason: HOSPADM
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
## 2020-03-12 ENCOUNTER — EXTERNAL HOME HEALTH (OUTPATIENT)
Dept: HOME HEALTH SERVICES | Facility: HOSPITAL | Age: 85
End: 2020-03-12

## 2020-03-13 PROBLEM — M43.10 SPONDYLOLISTHESIS, GRADE 1: Status: ACTIVE | Noted: 2020-03-13

## 2020-03-13 PROBLEM — M47.816 LUMBAR FACET ARTHROPATHY: Status: ACTIVE | Noted: 2020-03-13

## 2020-03-13 PROBLEM — M51.36 DDD (DEGENERATIVE DISC DISEASE), LUMBAR: Status: ACTIVE | Noted: 2020-03-13

## 2020-04-23 ENCOUNTER — TELEPHONE (OUTPATIENT)
Dept: PRIMARY CARE CLINIC | Facility: CLINIC | Age: 85
End: 2020-04-23

## 2020-04-23 DIAGNOSIS — S39.012A LUMBOSACRAL STRAIN, INITIAL ENCOUNTER: ICD-10-CM

## 2020-04-23 DIAGNOSIS — M51.36 DDD (DEGENERATIVE DISC DISEASE), LUMBAR: Primary | ICD-10-CM

## 2020-04-23 DIAGNOSIS — Z98.890 HISTORY OF HIP SURGERY: ICD-10-CM

## 2020-04-23 DIAGNOSIS — R29.898 SEVERE MUSCLE DECONDITIONING: ICD-10-CM

## 2020-04-23 NOTE — TELEPHONE ENCOUNTER
Please sign orders for PT and send back to Emy to be faxed to [facility] [phone]

## 2020-04-23 NOTE — TELEPHONE ENCOUNTER
----- Message from Garima Darby sent at 4/23/2020 10:20 AM CDT -----  Contact: [facility] (Agusto) [phone]  Agusto called to request orders for Physical therapy for this patient. Patient fell off of her wheel chair and had some injuries, therefore patient needs PT. Fax # is [phone]. Please advise.

## 2020-04-27 ENCOUNTER — TELEPHONE (OUTPATIENT)
Dept: PRIMARY CARE CLINIC | Facility: CLINIC | Age: 85
End: 2020-04-27

## 2020-04-27 NOTE — TELEPHONE ENCOUNTER
----- Message from Avis Mckeon sent at 4/27/2020 12:56 PM CDT -----  Contact: Amara with [facility]   Amara is calling about pulling up home health orders for the pt. Amara was requesting the patient's MRN. Please call and advise.

## 2020-04-29 ENCOUNTER — EXTERNAL HOME HEALTH (OUTPATIENT)
Dept: HOME HEALTH SERVICES | Facility: HOSPITAL | Age: 85
End: 2020-04-29
Payer: MEDICARE

## 2020-04-29 PROCEDURE — G0179 PR HOME HEALTH MD RECERTIFICATION: ICD-10-PCS | Mod: ,,, | Performed by: INTERNAL MEDICINE

## 2020-04-29 PROCEDURE — G0179 MD RECERTIFICATION HHA PT: HCPCS | Mod: ,,, | Performed by: INTERNAL MEDICINE

## 2020-04-29 NOTE — TELEPHONE ENCOUNTER
Spoke with amara at [facility] - gave her the MRN to look up the Home health orders they had sent previously to the [facility] to be uploaded for the physician to sign. I explained to amara the last HH orders we had received via the [facility] are dated for Jan 2019 and were electronically signed by Dr. Justin on 02/28/2019. Gave Amara the fax number to our office to put ATTN: Emy chan she needs current HH orders to be signed off on, on behalf of this pt.

## 2020-05-05 ENCOUNTER — TELEPHONE (OUTPATIENT)
Dept: PRIMARY CARE CLINIC | Facility: CLINIC | Age: 85
End: 2020-05-05

## 2020-05-05 NOTE — TELEPHONE ENCOUNTER
----- Message from Anna Hernadez sent at 5/5/2020  3:04 PM CDT -----  Contact: [facility] [phone]  Calling for status of order for wheel lchair for patient .  Harvey call back

## 2020-07-29 ENCOUNTER — TELEPHONE (OUTPATIENT)
Dept: PRIMARY CARE CLINIC | Facility: CLINIC | Age: 85
End: 2020-07-29

## 2020-07-29 NOTE — TELEPHONE ENCOUNTER
----- Message from Robinson Bergman sent at 7/29/2020  8:39 AM CDT -----  Regarding: Advice  Contact: Grand Daughter Tia [phone]  Patient would like to get medical advice.  Symptoms (please be specific):  bed sores   How long has patient had these symptoms:  A month and half   [facility] phone [id number] [place] [address] [facility] [address] [phone] (Phone) [phone] (Fax)    Comments: Grand daughter of patient calling stating the patient has bed sores on back side of butox, is in a lot of pain during the night, requesting response today ASAP if possible, would like to know if something can be sent to pharmacy, call to discuss next steps. Stating has happened prior to patient. Grand Daughter Tia [phone]    Please call an advise  Thank you

## 2020-07-29 NOTE — TELEPHONE ENCOUNTER
Attempted to contact patient's granddaughter, no answer. [facility] full unable to LM. Appointment scheduled for patient just to hold slot. Patient will need to be seen for bedsores and possible referral to wound care to have these issues addressed

## 2020-07-29 NOTE — TELEPHONE ENCOUNTER
----- Message from Jillian Gómez sent at 7/29/2020 12:21 PM CDT -----  Contact: Granddaughter  Type:  Patient Returning Call    Who Called:  Tia granddaughter  Who Left Message for Patient:  Crystal  Does the patient know what this is regarding?:  Apppointment  Best Call Back Number:  [phone]  Additional Information:  Missed your call, please call her back. Thanks.

## 2020-08-05 ENCOUNTER — LAB VISIT (OUTPATIENT)
Dept: PRIMARY CARE CLINIC | Facility: CLINIC | Age: 85
End: 2020-08-05
Payer: MEDICARE

## 2020-08-05 ENCOUNTER — OFFICE VISIT (OUTPATIENT)
Dept: PRIMARY CARE CLINIC | Facility: CLINIC | Age: 85
End: 2020-08-05
Payer: MEDICARE

## 2020-08-05 VITALS
SYSTOLIC BLOOD PRESSURE: 142 MMHG | DIASTOLIC BLOOD PRESSURE: 58 MMHG | TEMPERATURE: 98 F | HEART RATE: 68 BPM | RESPIRATION RATE: 18 BRPM | HEIGHT: 65 IN | OXYGEN SATURATION: 99 % | BODY MASS INDEX: 29.95 KG/M2

## 2020-08-05 DIAGNOSIS — M89.9 BONE DISEASE: ICD-10-CM

## 2020-08-05 DIAGNOSIS — I10 ESSENTIAL HYPERTENSION: Primary | ICD-10-CM

## 2020-08-05 DIAGNOSIS — M19.012 PRIMARY OSTEOARTHRITIS OF BOTH SHOULDERS: ICD-10-CM

## 2020-08-05 DIAGNOSIS — K59.1 FUNCTIONAL DIARRHEA: ICD-10-CM

## 2020-08-05 DIAGNOSIS — L01.00 IMPETIGO: ICD-10-CM

## 2020-08-05 DIAGNOSIS — M19.011 PRIMARY OSTEOARTHRITIS OF BOTH SHOULDERS: ICD-10-CM

## 2020-08-05 DIAGNOSIS — E66.3 OVERWEIGHT: ICD-10-CM

## 2020-08-05 DIAGNOSIS — B37.31 CANDIDA VAGINITIS: ICD-10-CM

## 2020-08-05 DIAGNOSIS — L29.9 PRURITUS, UNSPECIFIED: ICD-10-CM

## 2020-08-05 DIAGNOSIS — M51.36 DDD (DEGENERATIVE DISC DISEASE), LUMBAR: ICD-10-CM

## 2020-08-05 DIAGNOSIS — E11.9 TYPE 2 DIABETES MELLITUS WITHOUT COMPLICATION, WITHOUT LONG-TERM CURRENT USE OF INSULIN: ICD-10-CM

## 2020-08-05 LAB
ALBUMIN SERPL BCP-MCNC: 3.2 G/DL (ref 3.5–5.2)
ALP SERPL-CCNC: 161 U/L (ref 38–126)
ALT SERPL W/O P-5'-P-CCNC: 8 U/L (ref 14–54)
ANION GAP SERPL CALC-SCNC: 10 MMOL/L (ref 8–16)
AST SERPL-CCNC: 20 U/L (ref 15–41)
BASOPHILS # BLD AUTO: 0 K/UL (ref 0–0.2)
BASOPHILS NFR BLD: 0.7 % (ref 0–1.9)
BILIRUB SERPL-MCNC: 0.5 MG/DL (ref 0.3–1.2)
BUN SERPL-MCNC: 31 MG/DL (ref 10–30)
CALCIUM SERPL-MCNC: 9.8 MG/DL (ref 8.6–10)
CHLORIDE SERPL-SCNC: 103 MMOL/L (ref 101–111)
CHOLEST SERPL-MCNC: 141 MG/DL (ref 80–200)
CHOLEST/HDLC SERPL: 4.4 {RATIO} (ref 2–5)
CO2 SERPL-SCNC: 24 MMOL/L (ref 23–29)
CREAT SERPL-MCNC: 1.1 MG/DL (ref 0.5–1.4)
DIFFERENTIAL METHOD: ABNORMAL
EOSINOPHIL # BLD AUTO: 0.5 K/UL (ref 0–0.5)
EOSINOPHIL NFR BLD: 8.5 % (ref 0–8)
ERYTHROCYTE [DISTWIDTH] IN BLOOD BY AUTOMATED COUNT: 16 % (ref 11.5–14.5)
EST. GFR  (AFRICAN AMERICAN): 50 ML/MIN/1.73 M^2
EST. GFR  (NON AFRICAN AMERICAN): 43.4 ML/MIN/1.73 M^2
GLUCOSE SERPL-MCNC: 155 MG/DL (ref 74–118)
HCT VFR BLD AUTO: 36.6 % (ref 37–48.5)
HDLC SERPL-MCNC: 32 MG/DL (ref 40–75)
HDLC SERPL: 22.7 % (ref 20–50)
HGB BLD-MCNC: 11.5 G/DL (ref 12–16)
LDLC SERPL CALC-MCNC: 79 MG/DL
LYMPHOCYTES # BLD AUTO: 1.4 K/UL (ref 1–4.8)
LYMPHOCYTES NFR BLD: 22.9 % (ref 18–48)
MCH RBC QN AUTO: 24.3 PG (ref 27–31)
MCHC RBC AUTO-ENTMCNC: 31.3 G/DL (ref 32–36)
MCV RBC AUTO: 78 FL (ref 82–98)
MONOCYTES # BLD AUTO: 0.3 K/UL (ref 0.3–1)
MONOCYTES NFR BLD: 4.7 % (ref 4–15)
NEUTROPHILS # BLD AUTO: 3.9 K/UL (ref 1.8–7.7)
NEUTROPHILS NFR BLD: 63.2 % (ref 38–73)
NONHDLC SERPL-MCNC: 109 MG/DL
PLATELET # BLD AUTO: 269 K/UL (ref 150–350)
PMV BLD AUTO: 8.9 FL (ref 9.2–12.9)
POTASSIUM SERPL-SCNC: 5 MMOL/L (ref 3.5–5.1)
PROT SERPL-MCNC: 7.5 G/DL (ref 6–8.4)
RBC # BLD AUTO: 4.71 M/UL (ref 4–5.4)
SODIUM SERPL-SCNC: 137 MMOL/L (ref 136–145)
TRIGL SERPL-MCNC: 152 MG/DL (ref 30–150)
TSH SERPL DL<=0.005 MIU/L-ACNC: 3.73 UIU/ML (ref 0.45–5.33)
WBC # BLD AUTO: 6.1 K/UL (ref 3.9–12.7)

## 2020-08-05 PROCEDURE — 83036 HEMOGLOBIN GLYCOSYLATED A1C: CPT

## 2020-08-05 PROCEDURE — 36415 COLL VENOUS BLD VENIPUNCTURE: CPT | Mod: PBBFAC,PN

## 2020-08-05 PROCEDURE — 84443 ASSAY THYROID STIM HORMONE: CPT

## 2020-08-05 PROCEDURE — 80053 COMPREHEN METABOLIC PANEL: CPT

## 2020-08-05 PROCEDURE — 85025 COMPLETE CBC W/AUTO DIFF WBC: CPT

## 2020-08-05 PROCEDURE — 99214 OFFICE O/P EST MOD 30 MIN: CPT | Mod: PBBFAC,PN,25 | Performed by: INTERNAL MEDICINE

## 2020-08-05 PROCEDURE — 99214 OFFICE O/P EST MOD 30 MIN: CPT | Mod: S$PBB,,, | Performed by: INTERNAL MEDICINE

## 2020-08-05 PROCEDURE — 82306 VITAMIN D 25 HYDROXY: CPT

## 2020-08-05 PROCEDURE — 99214 PR OFFICE/OUTPT VISIT, EST, LEVL IV, 30-39 MIN: ICD-10-PCS | Mod: S$PBB,,, | Performed by: INTERNAL MEDICINE

## 2020-08-05 PROCEDURE — 99999 PR PBB SHADOW E&M-EST. PATIENT-LVL IV: ICD-10-PCS | Mod: PBBFAC,,, | Performed by: INTERNAL MEDICINE

## 2020-08-05 PROCEDURE — 99999 PR PBB SHADOW E&M-EST. PATIENT-LVL IV: CPT | Mod: PBBFAC,,, | Performed by: INTERNAL MEDICINE

## 2020-08-05 PROCEDURE — 80061 LIPID PANEL: CPT

## 2020-08-05 PROCEDURE — 96372 THER/PROPH/DIAG INJ SC/IM: CPT | Mod: PBBFAC,PN

## 2020-08-05 RX ORDER — TRIAMCINOLONE ACETONIDE 40 MG/ML
60 INJECTION, SUSPENSION INTRA-ARTICULAR; INTRAMUSCULAR ONCE
Status: COMPLETED | OUTPATIENT
Start: 2020-08-05 | End: 2020-08-05

## 2020-08-05 RX ORDER — MUPIROCIN 20 MG/G
OINTMENT TOPICAL 2 TIMES DAILY
Qty: 30 G | Refills: 2 | Status: ON HOLD | OUTPATIENT
Start: 2020-08-05 | End: 2020-01-01 | Stop reason: HOSPADM

## 2020-08-05 RX ORDER — MINOCYCLINE HYDROCHLORIDE 100 MG/1
100 CAPSULE ORAL 2 TIMES DAILY
Qty: 20 CAPSULE | Refills: 0 | Status: SHIPPED | OUTPATIENT
Start: 2020-08-05 | End: 2020-08-15

## 2020-08-05 RX ORDER — LINCOMYCIN HYDROCHLORIDE 300 MG/ML
600 INJECTION, SOLUTION INTRAMUSCULAR; INTRAVENOUS; SUBCONJUNCTIVAL ONCE
Status: COMPLETED | OUTPATIENT
Start: 2020-08-05 | End: 2020-08-05

## 2020-08-05 RX ORDER — FLUCONAZOLE 150 MG/1
150 TABLET ORAL DAILY
Qty: 7 TABLET | Refills: 0 | Status: SHIPPED | OUTPATIENT
Start: 2020-08-05 | End: 2020-08-12

## 2020-08-05 RX ORDER — LOPERAMIDE HYDROCHLORIDE 2 MG/1
2 CAPSULE ORAL 2 TIMES DAILY PRN
Qty: 45 CAPSULE | Refills: 1 | Status: SHIPPED | OUTPATIENT
Start: 2020-08-05 | End: 2020-08-15

## 2020-08-05 RX ADMIN — TRIAMCINOLONE ACETONIDE 60 MG: 400 INJECTION, SUSPENSION INTRA-ARTICULAR; INTRAMUSCULAR at 11:08

## 2020-08-05 RX ADMIN — LINCOMYCIN HYDROCHLORIDE 600 MG: 300 INJECTION, SOLUTION INTRAMUSCULAR; INTRAVENOUS; SUBCONJUNCTIVAL at 11:08
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
## 2020-08-05 NOTE — PROGRESS NOTES
Verified pt ID using name and . Allergies verified with pt. Administered Lincocin 600 mg IM in Left VG, and Kenalog 60 mg IM in Right VG per physician order using aseptic technique. Aspirated and no blood return noted. Pt tolerated well with no adverse reactions noted.

## 2020-08-06 LAB
25(OH)D3+25(OH)D2 SERPL-MCNC: 24 NG/ML (ref 30–96)
ESTIMATED AVG GLUCOSE: 148 MG/DL (ref 68–131)
HBA1C MFR BLD HPLC: 6.8 % (ref 4–5.6)

## 2020-08-07 ENCOUNTER — TELEPHONE (OUTPATIENT)
Dept: PRIMARY CARE CLINIC | Facility: CLINIC | Age: 85
End: 2020-08-07

## 2020-08-07 NOTE — TELEPHONE ENCOUNTER
Called pt. With results , no answer  Unable to leave vm being vm is full. Sent portal message and letter

## 2020-09-14 NOTE — PROGRESS NOTES
Subjective:    The patient location is: home  The chief complaint leading to consultation is: swelling left leg with pain fluid    Visit type: audiovisual    Face to Face time with patient: 15   minutes of total time spent on the encounter, which includes face to face time and non-face to face time preparing to see the patient (eg, review of tests), Obtaining and/or reviewing separately obtained history, Documenting clinical information in the electronic or other health record, Independently interpreting results (not separately reported) and communicating results to the patient/family/caregiver, or Care coordination (not separately reported).         Each patient to whom he or she provides medical services by telemedicine is:  (1) informed of the relationship between the physician and patient and the respective role of any other health care provider with respect to management of the patient; and (2) notified that he or she may decline to receive medical services by telemedicine and may withdraw from such care at any time.    Notes:    Patient ID: Hedy Pierre is a 89 y.o. female.    Chief Complaint: No chief complaint on file.    HPI   Pt visi today with help of her granddaughter pt is staying with  She states her left leg has been swelling and lats few turn red painful with pressure and skin so thin shining her Rt leg is ok. She denies fever chill no n/v/d no MUIR  No h/o DVT no fever chill she currently not on any diuretic  Review of Systems    Objective:      Physical Exam  awake in wheel chair follow berbal comom  Assessment:       1. Cellulitis of left leg    2. Venous stasis ulcer of left calf, unspecified ulcer stage, unspecified whether varicose veins present    3. Type 2 diabetes mellitus without complication, without long-term current use of insulin        Plan:       Cellulitis of left leg  -     clindamycin (CLEOCIN) 300 MG capsule; Take 1 capsule (300 mg total) by mouth every 6 (six) hours.   Dispense: 40 capsule; Refill: 0  -     minocycline (MINOCIN,DYNACIN) 100 MG capsule; Take 1 capsule (100 mg total) by mouth 2 (two) times daily. for 10 days  Dispense: 20 capsule; Refill: 0  -     mupirocin (BACTROBAN) 2 % ointment; Apply topically 2 (two) times daily.  Dispense: 22 g; Refill: 3  -     traMADoL (ULTRAM) 50 mg tablet; Take 1 tablet (50 mg total) by mouth every 12 (twelve) hours as needed.  Dispense: 30 tablet; Refill: 0  -     US Lower Extremity Veins Left; Future; Expected date: 09/16/2020    Venous stasis ulcer of left calf, unspecified ulcer stage, unspecified whether varicose veins present  -     furosemide (LASIX) 40 MG tablet; Take 1 tablet (40 mg total) by mouth once daily.  Dispense: 30 tablet; Refill: 1  -     mupirocin (BACTROBAN) 2 % ointment; Apply topically 2 (two) times daily.  Dispense: 22 g; Refill: 3  -     US Lower Extremity Veins Left; Future; Expected date: 09/16/2020    Type 2 diabetes mellitus without complication, without long-term current use of insulin  Comments:  controlled with diet medications Hgba1c 6.8 continue with treatment        Medication List with Changes/Refills   New Medications    CLINDAMYCIN (CLEOCIN) 300 MG CAPSULE    Take 1 capsule (300 mg total) by mouth every 6 (six) hours.    FUROSEMIDE (LASIX) 40 MG TABLET    Take 1 tablet (40 mg total) by mouth once daily.    MINOCYCLINE (MINOCIN,DYNACIN) 100 MG CAPSULE    Take 1 capsule (100 mg total) by mouth 2 (two) times daily. for 10 days    MUPIROCIN (BACTROBAN) 2 % OINTMENT    Apply topically 2 (two) times daily.    TRAMADOL (ULTRAM) 50 MG TABLET    Take 1 tablet (50 mg total) by mouth every 12 (twelve) hours as needed.   Current Medications    ACETAMINOPHEN (TYLENOL) 325 MG TABLET    Take 325 mg by mouth every 6 (six) hours as needed for Pain.    AMLODIPINE (NORVASC) 5 MG TABLET    Take 1 tablet (5 mg total) by mouth once daily.    ASCORBIC ACID, VITAMIN C, (VITAMIN C) 500 MG TABLET    Take 500 mg by mouth 2  (two) times daily.    ASPIRIN (ECOTRIN) 81 MG EC TABLET    Take 81 mg by mouth once daily.    CALCIUM-VITAMIN D3 (CALCIUM 500 + D) 500 MG(1,250MG) -200 UNIT PER TABLET    Take 1 tablet by mouth 2 (two) times daily with meals.    CLOTRIMAZOLE-BETAMETHASONE 1-0.05% (LOTRISONE) CREAM    Apply topically 2 (two) times daily.    DOCUSATE SODIUM (COLACE) 100 MG CAPSULE    Take 100 mg by mouth 2 (two) times daily.    GLIMEPIRIDE (AMARYL) 4 MG TABLET    TAKE 1 TABLET ONE TIME DAILY WITH BREAKFAST OR THE FIRST MAIN MEAL OF THE DAY    METFORMIN (GLUCOPHAGE) 500 MG TABLET    Take 1 tablet (500 mg total) by mouth 2 (two) times daily with meals.    MIRTAZAPINE (REMERON) 15 MG TABLET    Take 1 tablet (15 mg total) by mouth every evening.    MUPIROCIN (BACTROBAN) 2 % OINTMENT    Apply topically 2 (two) times daily.    PROTEIN SUPPLEMENT (PROMOD PROTEIN) LIQD    Take 30 mLs by mouth once daily.    SIMETHICONE (MYLICON) 80 MG CHEWABLE TABLET    Take 160 mg by mouth every 6 (six) hours as needed for Flatulence.    ZINC SULFATE (ZINC-220 ORAL)    Take 1 tablet by mouth once daily.   Discontinued Medications    TRAMADOL (ULTRAM) 50 MG TABLET    Take 25 mg by mouth every 6 (six) hours as needed for Pain.

## 2020-09-14 NOTE — TELEPHONE ENCOUNTER
Bilateral lower extremity swelling x 2 weeks, gotten worse in the last few days. Toes super swollen, skin looks to be stretching. Left foot is worse than the right, Skin is taught and shiny.

## 2020-09-14 NOTE — TELEPHONE ENCOUNTER
----- Message from Jillian Gómez sent at 9/14/2020 12:59 PM CDT -----  Contact: Granddaughter  Type: Needs Medical Advice    Who Called:  von Weber  Symptoms (please be specific):  Bilateral feet swelling  How long has patient had these symptoms:  1 week  Pharmacy name and phone #:    [id number] - [place], [place] - [address]  [address]  [place] [address]  Phone: [phone] Fax: [phone]  Best Call Back Number: [phone]  Additional Information: Calling for advise. Please call her. Thanks.

## 2020-09-18 PROBLEM — I82.432 ACUTE DEEP VEIN THROMBOSIS (DVT) OF POPLITEAL VEIN OF LEFT LOWER EXTREMITY: Status: ACTIVE | Noted: 2020-01-01

## 2020-09-23 NOTE — PROGRESS NOTES
C3 nurse attempted to contact patient. No answer.  C3 nurse attempted to contact Hedy Pierre for a TCC post hospital discharge follow up call. No answer at phone number listed and no voicemail available. The patient does not have a scheduled HOSFU appointment within 7 days post hospital discharge date 9/22.

## 2020-10-08 NOTE — PROGRESS NOTES
Subjective:       Patient ID: Hedy Pierre is a 89 y.o. female.    Chief Complaint: Vaginal Bleeding (pass 3 days on and off ), Dizziness, and Vaginal Itching (pass 3 days on and off )    Vaginal Bleeding  The patient's pertinent negatives include no pelvic pain. Associated symptoms include back pain (low back, chronic, unchanged). Pertinent negatives include no abdominal pain, chills, diarrhea, dysuria, fever, frequency, nausea or vomiting.   Dizziness:    Associated symptoms: hearing loss (chronic) and weakness.no fever, no nausea, no vomiting, no diaphoresis, no palpitations and no chest pain.  Vaginal Itching  The patient's pertinent negatives include no pelvic pain. Associated symptoms include back pain (low back, chronic, unchanged). Pertinent negatives include no abdominal pain, chills, diarrhea, dysuria, fever, frequency, nausea or vomiting.      Granddaughter (present, reports herself as power of ) states that she wasn't able to see the initial reported bleeding, but was seen by home caretaker and reported to her to her after bleeding observed in shower 10/7/2020. Spotting in diaper past 2 days on diaper pad prior to episode of bleeding in the shower 1/7/2020. Blood was seen by caretaker draining from vaginal canal into shower drain. Uncertain if source was urethral or vaginal. Denies new pains.    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 Was started on Rivaroxaban at that time. Reports patient also has been less active and complaining of dizziness. Feels like room is spinning. Patient reports not related to head movement. When granddaughter changed patient today did not see any further bleeding.    No prior vaginal bleeding. Has history of UTI and nephrolithiasis. Last time granddaughter reports seeing urethral bleeding was in 2012 secondary to nephrolithiasis. Still frequently drinks soda. No newly reported flank pain.    Menopause unknown. Patient reports  "uterus is still present. Reports burning and pain to posterior region. Hard to keep her dry in bed and constantly scratches legs granddaughter reports..      Review of Systems   Constitutional: Positive for activity change (Decreased), appetite change (Decreased) and fatigue. Negative for chills, diaphoresis and fever.   HENT: Positive for hearing loss (chronic).    Respiratory: Negative for cough and shortness of breath.    Cardiovascular: Negative for chest pain and palpitations.   Gastrointestinal: Negative for abdominal pain, diarrhea, nausea and vomiting.   Genitourinary: Positive for vaginal bleeding. Negative for dysuria, frequency and pelvic pain.   Musculoskeletal: Positive for back pain (low back, chronic, unchanged). Negative for joint swelling and myalgias.   Skin: Positive for wound (reports "sores on bottom" granddaughter states skin seems raw).   Neurological: Positive for dizziness and weakness. Negative for syncope.       Objective:      Vitals:    10/08/20 1327 10/08/20 1423   BP: (!) 142/78 (!) 140/76   BP Location: Right arm Right arm   Patient Position: Sitting Sitting   BP Method: Small (Manual) Medium (Manual)   Pulse: 94    Resp: 16    Temp: 97.8 °F (36.6 °C)    TempSrc: Oral    SpO2: 99%      Physical Exam  Vitals signs reviewed.   Constitutional:       General: She is not in acute distress.     Appearance: She is not ill-appearing.      Comments: Frail   HENT:      Head: Normocephalic and atraumatic.      Mouth/Throat:      Mouth: Mucous membranes are moist.      Pharynx: Oropharynx is clear.   Eyes:      General:         Right eye: No discharge.         Left eye: No discharge.   Cardiovascular:      Rate and Rhythm: Normal rate.   Pulmonary:      Effort: Pulmonary effort is normal. No respiratory distress.   Abdominal:      Palpations: Abdomen is soft.      Tenderness: There is no abdominal tenderness.   Genitourinary:     Comments: Unable to position patient on table with stirrups for " full vaginal exam. External labia and vulva seen with mucosal thinning.    Small spot of blood seen in diaper consistent with gluteal region superficial to ischium. Stage I ulcer present medial thighs to ischial region bilaterally. No skin breakdown visible.  Musculoskeletal:         General: No deformity.   Skin:     General: Skin is warm and dry.      Coloration: Skin is not jaundiced.      Findings: Bruising (forearms) present.   Neurological:      Mental Status: She is alert.      Cranial Nerves: No facial asymmetry.      Motor: Weakness present.      Comments: Severe hearing impairment, oriented to place and situation, orientation to time not assessed   Psychiatric:         Mood and Affect: Mood normal.         Behavior: Behavior normal.             Lab Results   Component Value Date     (L) 09/22/2020    K 4.1 09/22/2020    CL 97 (L) 09/22/2020    CO2 24 09/22/2020    BUN 27 09/22/2020    CREATININE 1.2 09/22/2020    ANIONGAP 11 09/22/2020     Lab Results   Component Value Date    HGBA1C 6.6 (H) 09/20/2020     No results found for: BNP, BNPTRIAGEBLO    Lab Results   Component Value Date    WBC 5.60 09/22/2020    HGB 11.3 (L) 09/22/2020    HCT 34.7 (L) 09/22/2020     09/22/2020    GRAN 3.4 09/22/2020    GRAN 60.9 09/22/2020     Lab Results   Component Value Date    CHOL 141 08/05/2020    HDL 32 (L) 08/05/2020    LDLCALC 79 08/05/2020    TRIG 152 (H) 08/05/2020          Current Outpatient Medications:     acetaminophen (TYLENOL) 325 MG tablet, Take 325 mg by mouth every 6 (six) hours as needed for Pain., Disp: , Rfl:     amLODIPine (NORVASC) 5 MG tablet, Take 1 tablet (5 mg total) by mouth once daily., Disp: 90 tablet, Rfl: 3    ascorbic acid, vitamin C, (VITAMIN C) 500 MG tablet, Take 500 mg by mouth 2 (two) times daily., Disp: , Rfl:     aspirin (ECOTRIN) 81 MG EC tablet, Take 81 mg by mouth once daily., Disp: , Rfl:     calcium-vitamin D3 (CALCIUM 500 + D) 500 mg(1,250mg) -200 unit per  tablet, Take 1 tablet by mouth 2 (two) times daily with meals., Disp: , Rfl:     docusate sodium (COLACE) 100 MG capsule, Take 100 mg by mouth 2 (two) times daily., Disp: , Rfl:     furosemide (LASIX) 40 MG tablet, TAKE 1 TABLET BY MOUTH EVERY DAY, Disp: 30 tablet, Rfl: 1    metFORMIN (GLUCOPHAGE) 500 MG tablet, Take 1 tablet (500 mg total) by mouth 2 (two) times daily with meals., Disp: 180 tablet, Rfl: 3    mirtazapine (REMERON) 15 MG tablet, Take 1 tablet (15 mg total) by mouth every evening., Disp: 90 tablet, Rfl: 3    mupirocin (BACTROBAN) 2 % ointment, Apply topically 2 (two) times daily., Disp: 22 g, Rfl: 3    pantoprazole (PROTONIX) 40 MG tablet, Take 1 tablet (40 mg total) by mouth once daily., Disp: 90 tablet, Rfl: 3    protein supplement (PROMOD PROTEIN) Liqd, Take 30 mLs by mouth once daily., Disp: , Rfl:     rivaroxaban (XARELTO) 20 mg Tab, Take 1 tablet (20 mg total) by mouth daily with dinner or evening meal., Disp: 90 tablet, Rfl: 1    simethicone (MYLICON) 80 MG chewable tablet, Take 160 mg by mouth every 6 (six) hours as needed for Flatulence., Disp: , Rfl:     traMADoL (ULTRAM) 50 mg tablet, Take 1 tablet (50 mg total) by mouth every 12 (twelve) hours as needed., Disp: 30 tablet, Rfl: 0    zinc sulfate (ZINC-220 ORAL), Take 1 tablet by mouth once daily., Disp: , Rfl:         Assessment:       1. Abnormal vaginal bleeding in postmenopausal patient    2. Dizziness    3. Decreased energy           Plan:       Abnormal vaginal bleeding in postmenopausal patient  -     TSH; Future; Expected date: 10/08/2020  -     CBC auto differential; Future; Expected date: 10/08/2020  -     Urinalysis  - High suspicion for bleeding from superficial source (I.e observed thinned vaginal mucosa vs ulcerations present to medial/posterior thighs with recent Rivaroxaban initiation confounding  - Unable to perform speculum exam today's visit, consider Ob/Gyn referral if bleeding worsens/recurrs    Dizziness  -      TSH; Future; Expected date: 10/08/2020  -     CBC auto differential; Future; Expected date: 10/08/2020  -     Comprehensive Metabolic Panel; Future; Expected date: 10/08/2020    Decreased energy  -     TSH; Future; Expected date: 10/08/2020  -     CBC auto differential; Future; Expected date: 10/08/2020  -     Comprehensive Metabolic Panel; Future; Expected date: 10/08/2020    Keep follow-up with PCP, RTC if bleeding recurs or worsens

## 2020-10-08 NOTE — PATIENT INSTRUCTIONS
Dizziness (Uncertain Cause)  Dizziness is a common symptom. It may be described as lightheadedness, spinning, or feeling like you are going to faint. Dizziness can have many causes.  Be sure to tell the healthcare provider about:  · All medicines you take, including prescription, over-the-counter, herbs, and supplements  · Any other symptoms you have  · Any health problems you are being treated for  · Anything that causes the dizziness to get worse or better  Today's exam did not show an exact cause for your dizziness. Other tests may be needed. Follow up with your healthcare provider.  Home care  · Dizziness that occurs with sudden standing may be a sign of mild dehydration. Drink extra fluids for the next few days.  · If you recently started a new medicine, stopped a medicine, or had the dose of a current medicine changed, talk with the prescribing healthcare provider. Your medicine plan may need adjustment.  · If dizziness lasts more than a few seconds, sit or lie down until it passes. This may help prevent injury in case you pass out.  · Do not drive or use power tools or dangerous equipment until you have had no dizziness for at least 48 hours.  Follow-up care  Follow up with your healthcare provider for further evaluation within the next 7 days or as advised.  When to seek medical advice  Call your healthcare provider for any of the following:  · Worsening of symptoms or new symptoms  · Passing out or seizure  · Repeated vomiting  · Headache  · Palpitations (the sense that your heart is fluttering or beating fast or hard)  · Shortness of breath  · Blood in vomit or stool (black or red color)  · Weakness of an arm or leg or one side of the face  · Vision or hearing changes  · Trouble walking or speaking  · Chest, arm, neck, back, or jaw pain  Date Last Reviewed: 8/23/2015  © 8142-3465 [employer]. [address] [place] [address], [place] [address]. All rights reserved. This information is not intended as  a substitute for professional medical care. Always follow your healthcare professional's instructions.        Understanding Uterine Bleeding  Your uterine bleeding may be heavy. Or you may have bleeding between periods. These problems may be caused by hormonal imbalance. Or they can be caused by uterine growths, an intrauterine device (IUD), bleeding disorder, or pregnancy.  Hormonal imbalance  Your menstrual cycle is controlled by hormones. The hormones include estrogen and progesterone. Sometimes there is too much or too little of 1 or both of these hormones. This can cause heavy periods. Or it can cause bleeding between periods. Causes of hormonal imbalance can include:  · Hormonal changes in teens and in women nearing menopause  · Diabetes, thyroid disease, or other medical problems  · Obesity  · Stress  · Strenuous exercise  · Anorexia (an eating disorder)  · Pregnancy  Uterine growths  There are different kinds of uterine growths. These include:  · Fibroids. These are round knots of muscle tissue in the uterus.  · Polyps. These are soft tissue growths in the uterine lining. They often hang into the uterus.  · Adenomyosis. This is when the uterine lining grows into the muscle wall.  · Hyperplasia. This is when the uterine lining gets too thick or grows too much.  · Endometrial cancer. This is uncontrolled growth of part of the uterine lining.  Other causes of uterine bleeding  There are other causes of uterine bleeding. These include:  · IUD (intrauterine device). This is a method of birth control. Some IUDs contain hormones.  · Bleeding disorders. This is when the blood can't clot properly.  Treatment  Your health care provider can help diagnose the cause of your bleeding problem. He or she will work then work with you to plan treatment as needed.  Date Last Reviewed: 7/6/2015 © 2000-2017 [employer]. [address] [place] [address], [place] [address]. All rights reserved. This information is not  intended as a substitute for professional medical care. Always follow your healthcare professional's instructions.

## 2020-10-08 NOTE — TELEPHONE ENCOUNTER
Spoke with Tia she states that a few days ago patient was peeing or having vaginal bleeding.  Tia states that it is hard to tell.  Patient wears a diaper due to being incontinent.  Blood is dark red and not continuous.  Tia states patient is having some spotting today.  Denies that patient is saturating a pad.  Advised Tia to have patient seen in the office today.  Tia verbalized understanding. Appointment made with Dr. Chapincito Javed at 1 pm today.     Reason for Disposition   Taking Coumadin (warfarin) or other strong blood thinner, or known bleeding disorder (e.g., thrombocytopenia)    Additional Information   Negative: Passed out (i.e., fainted, collapsed and was not responding)   Negative: Difficult to awaken or acting confused (e.g., disoriented, slurred speech)   Negative: Shock suspected (e.g., cold/pale/clammy skin, too weak to stand)   Negative: SEVERE bleeding (e.g., continuous red blood from vagina, or large blood clots) and very weak (can't stand)   Negative: Sounds like a life-threatening emergency to the triager   Negative: SEVERE abdominal pain (e.g., excruciating)   Negative: SEVERE dizziness (e.g., unable to stand, requires support to walk, feels like passing out now)   Negative: Passed tissue (e.g., gray-white)   Negative: SEVERE vaginal bleeding (i.e., soaking 2 pads or tampons per hour and present 2 or more hours)   Negative: MODERATE vaginal bleeding (i.e., soaking pad or tampon per hour and present > 6 hours)   Negative: Pale skin (pallor) of new onset or worsening   Negative: Constant abdominal pain lasting > 2 hours   Negative: Patient sounds very sick or weak to the triager    Protocols used: ST VAGINAL BLEEDING - [id number]

## 2020-10-08 NOTE — TELEPHONE ENCOUNTER
----- Message from Clara Allen sent at 10/8/2020 11:25 AM CDT -----  Contact: Tia/Grand Daughter/[phone]  .1 Patient would like to get medical advice.  Symptoms (please be specific): while urinating blood can be seen, spotting.   How long has patient had these symptoms: a couple of days ago.   Pharmacy name and phone#:[id number] - [place] - [address] [phone] (Phone) [phone] (Fax)   Any drug allergies: on file  Comments: Patient would like to get medical advice. Transferred call to oncall nurse. Thank you

## 2020-10-09 NOTE — TELEPHONE ENCOUNTER
Patient's caregiver has been notified of lab results and repeat CMP  In 1 week  . Also instructed that patient can  d/c diuretic lasix and that she can take potassium supplement or bananas     ----- Message from Delroy Justin MD sent at 10/9/2020  5:07 AM CDT -----  Please call the patient regarding her abnormal result.  CMP low potassium slight increase in creatinine probably from diuretic Lasix can stop Lasix and if bananas to get potassium supplement repeat CMP in 1 week make sure okay with Dr. Beckham since that he order the test

## 2020-10-19 NOTE — TELEPHONE ENCOUNTER
----- Message from Jillian Gómez sent at 10/19/2020  4:05 PM CDT -----  Contact: Granddaughter  Type: Needs Medical Advice    Who Called:  von Weber  Symptoms (please be specific):  N/A  How long has patient had these symptoms:  N/A  Pharmacy name and phone #:  N/A  Best Call Back Number: [phone]  Additional Information: Calling because [employer] is not accepting the Financial Power of  that she has, they want a letter stating the patient is unable to handle affairs. Please advise. Thanks.

## 2020-10-20 NOTE — TELEPHONE ENCOUNTER
Ok for letter pt not able to handle her finacial affair due to multiple deteriating medical conditions

## 2020-10-26 NOTE — TELEPHONE ENCOUNTER
----- Message from Jillian Gómez sent at 10/26/2020  4:18 PM CDT -----  Contact: Granddaughter  Type: Needs Medical Advice    Who Called:  angy Weber  Symptoms (please be specific):  Legs swelling  How long has patient had these symptoms:  4 or 5 days  Pharmacy name and phone #:    [id number] [place] - [address]  [address]  [place] [address]  Phone: [phone] Fax: [phone]  Best Call Back Number: [phone]  Additional Information: Calling because her grandmothers legs are swelling again. Please advise. Thanks.

## 2020-10-26 NOTE — TELEPHONE ENCOUNTER
Spoke with Tia pt. granddaughter regarding lower extremity swelling. Pt. Prescribed Lasix 40 mg daily, will call back to confirm she is taking this medication. Pt.  grand-daughter states pt. Is not experiencing any symptoms of fluid overload other than pitting edema. Denies SOB, BP And Pulse WNL. Unsure about weight gain. Pt. Grand-daughter states pt. Did not go to the ED but is this something she should get evaluated immediately OR what does Dr. Justin suggest she do for edema?

## 2020-10-27 NOTE — TELEPHONE ENCOUNTER
Called adriane elam. Grand-daughter no answer unable to leave VM, mailbox is full. Will try calling again later

## 2020-10-28 NOTE — TELEPHONE ENCOUNTER
Spoke with pt. Grand-daughter tia states she has been checking in with her grandmother's caregiver Kimberlee that states, she is still having some swelling in both her feet. Taking Lasix 40 mg daily and Xarelto 20 mg for clot she currently has. Pt. Daughter states she has been elevating her legs frequently. Denies SOB. Notified if swelling gets worse to go to the ER or if SOB arises. Pt. Grand-Daughter (Tia) Scheduled follow up with Dr. Justin on 11/11/20 at 3 pm as she is the one who brings her to all physician appointments.

## 2020-10-28 NOTE — TELEPHONE ENCOUNTER
IS THE PATIENT SUPPOSE TO CONTINUE TO TAKE THE LASIX OR NOT .  DO NOT SEND REFILL  IF NOT    ----- Message from Omaira Vela sent at 10/28/2020 11:56 AM CDT -----  Regarding: Grand-daughter adriane [phone]  Requesting an RX refill or new RX.  Is this a refill or new RX: Refill  RX name and strength: furosemide (LASIX) 40 MG tablet  Is this a 30 day or 90 day RX: 90  Pharmacy name and phone # [facility] #[id number]  [place] - [address] [phone] (Phone) [phone] (Fax)    Comments: She said she realized after she hung up that the patient is out of the lasix.    She wants your to call her to let her know for sure if the patient should be taking this because, she thinks you told her to stop taking it.

## 2020-11-05 NOTE — TELEPHONE ENCOUNTER
Spoke with caregiver/grandaubridget forrest she states pt. Has been extremely itchy all over only visual irritation where she is actually scratching and making it red. Itchiness has been going on x 1 week , nothing has changed that she would have contact with that could cause it. Also diaper rash is still irritated as its difficult to keep dry enough to heal being she constantly leaks urine all day . They clean and change her all day . The lasix was refilled is this something she should continue taking?

## 2020-11-05 NOTE — TELEPHONE ENCOUNTER
----- Message from Tangela Ching sent at 11/5/2020 11:33 AM CST -----  Contact: tia([facility]) [phone]  Tia states she has called and left message in ref to speaking to the nurse about the pt having a extreme itchy ness all over her body. Please call and advise. Thank you

## 2020-11-05 NOTE — TELEPHONE ENCOUNTER
Notified caregiver/ granddaughter Tia that rx has been sent to the pharmacy and can hold lasix to see if it helps urine incontinence

## 2020-11-11 NOTE — TELEPHONE ENCOUNTER
Spoke with grand-daughter Tia and she is unloading her delivery now and she is trying to get home to do a viral visit if not will send a picture through the portal so he can see the wound.

## 2020-11-25 NOTE — PROGRESS NOTES
Subjective:       Patient ID: Hedy Pierre is a 89 y.o. female.    Chief Complaint: Edema    HPI  Pt visit today for f/u multiple c/o and does not want to go into NH not get the care she neds she in NH and had to leave now stay with granddaughter who does the best she can to care for pt and working pt has urine and fecal incontinence wear diaper constantly leaking urine and has sacral decubitus no fever chill no n/v/d c/o fluid in legs with blisters no sob cp no fever chill her po diet is good BM no constipation or diarrhea. Pt c/o pain in sacrum area from decubitus lower back pain knee pain and strong odor urine  Review of Systems    Objective:      Physical Exam  Vitals signs and nursing note reviewed.   Constitutional:       General: She is not in acute distress.     Appearance: She is well-developed. She is obese.   HENT:      Head: Normocephalic and atraumatic.      Right Ear: External ear normal.      Left Ear: External ear normal.      Nose: Nose normal.      Mouth/Throat:      Pharynx: No oropharyngeal exudate.   Eyes:      General:         Right eye: No discharge.         Left eye: No discharge.      Conjunctiva/sclera: Conjunctivae normal.      Pupils: Pupils are equal, round, and reactive to light.   Neck:      Musculoskeletal: Normal range of motion and neck supple.      Thyroid: No thyromegaly.   Cardiovascular:      Rate and Rhythm: Normal rate and regular rhythm.      Heart sounds: Normal heart sounds. No murmur. No friction rub. No gallop.    Pulmonary:      Effort: Pulmonary effort is normal. No respiratory distress.      Breath sounds: Normal breath sounds. No wheezing or rales.   Abdominal:      General: Bowel sounds are normal. There is no distension.      Palpations: Abdomen is soft.      Tenderness: There is no abdominal tenderness. There is no guarding.   Musculoskeletal: Normal range of motion.         General: No tenderness or deformity.   Lymphadenopathy:      Cervical: No cervical  adenopathy.   Skin:     General: Skin is warm and dry.      Capillary Refill: Capillary refill takes less than 2 seconds.      Findings: No erythema or rash.      Comments: grade1-2 sacrial and gluteal decubitus with erythema no exudate no skin breakdown yet   Neurological:      Mental Status: She is alert and oriented to person, place, and time.      Comments: Ambulate with walker   Psychiatric:         Thought Content: Thought content normal.         Judgment: Judgment normal.      Comments: anxious         Assessment:       1. Type 2 diabetes mellitus without complication, without long-term current use of insulin    2. Need for vaccination    3. Acute cystitis without hematuria    4. Tinea cruris    5. Lumbar facet arthropathy    6. Primary osteoarthritis of both knees    7. Venous stasis ulcer of left calf, unspecified ulcer stage, unspecified whether varicose veins present    8. Essential hypertension        Plan:       Type 2 diabetes mellitus without complication, without long-term current use of insulin  Comments:  fairly controlled last Hgba1c 6.6  Orders:  -     Ambulatory referral/consult to Optometry; Future; Expected date: 12/02/2020    Need for vaccination  -     Influenza - Quadrivalent (Adjuvanted)  -     varicella-zoster [id number], PF, (SHINGRIX, PF,) 50 mcg/0.5 mL injection; Inject 0.5 mLs into the muscle once. for 1 dose  Dispense: 1 each; Refill: 0  -     Pneumococcal Conjugate Vaccine (13 Valent) (IM)    Acute cystitis without hematuria  -     nitrofurantoin, macrocrystal-monohydrate, (MACROBID) 100 MG capsule; Take 1 capsule (100 mg total) by mouth 2 (two) times daily.  Dispense: 20 capsule; Refill: 1    Tinea cruris  -     Discontinue: clotrimazole-betamethasone 1-0.05% (LOTRISONE) cream; Apply topically 2 (two) times daily.  Dispense: 45 g; Refill: 2    Lumbar facet arthropathy  -     triamcinolone acetonide injection 60 mg    Primary osteoarthritis of both knees  -     triamcinolone acetonide  injection 60 mg    Venous stasis ulcer of left calf, unspecified ulcer stage, unspecified whether varicose veins present  Comments:  continue [facility] diuretic and local antibiotic ointment    Essential hypertension  Comments:  stable on medications        Medication List with Changes/Refills   New Medications    CLOTRIMAZOLE (LOTRIMIN) 1 % CREAM    Please specify directions, refills and quantity    NITROFURANTOIN, MACROCRYSTAL-MONOHYDRATE, (MACROBID) 100 MG CAPSULE    Take 1 capsule (100 mg total) by mouth 2 (two) times daily.   Current Medications    ACETAMINOPHEN (TYLENOL) 325 MG TABLET    Take 325 mg by mouth every 6 (six) hours as needed for Pain.    AMLODIPINE (NORVASC) 5 MG TABLET    Take 1 tablet (5 mg total) by mouth once daily.    DOCUSATE SODIUM (COLACE) 100 MG CAPSULE    Take 100 mg by mouth 2 (two) times daily.    FUROSEMIDE (LASIX) 40 MG TABLET    Take 1 tablet (40 mg total) by mouth once daily.    HYDROXYZINE HCL (ATARAX) 25 MG TABLET    Take 1 tablet (25 mg total) by mouth 3 (three) times daily as needed for Itching.    METFORMIN (GLUCOPHAGE) 500 MG TABLET    Take 1 tablet (500 mg total) by mouth 2 (two) times daily with meals.    MIRTAZAPINE (REMERON) 15 MG TABLET    Take 1 tablet (15 mg total) by mouth every evening.    MULTIVITAMIN (MULTIPLE VITAMIN ORAL)    Take by mouth. 2 gummies qd    MUPIROCIN (BACTROBAN) 2 % OINTMENT    Apply topically 2 (two) times daily.    PANTOPRAZOLE (PROTONIX) 40 MG TABLET    Take 1 tablet (40 mg total) by mouth once daily.    PROTEIN SUPPLEMENT (PROMOD PROTEIN) LIQD    Take 30 mLs by mouth once daily.    RIVAROXABAN (XARELTO) 20 MG TAB    Take 1 tablet (20 mg total) by mouth daily with dinner or evening meal.    SIMETHICONE (MYLICON) 80 MG CHEWABLE TABLET    Take 160 mg by mouth every 6 (six) hours as needed for Flatulence.    TRAMADOL (ULTRAM) 50 MG TABLET    Take 1 tablet (50 mg total) by mouth every 12 (twelve) hours as needed.    ZINC SULFATE (ZINC-220 ORAL)    Take 1  tablet by mouth once daily.   Discontinued Medications    ASCORBIC ACID, VITAMIN C, (VITAMIN C) 500 MG TABLET    Take 500 mg by mouth 2 (two) times daily.    ASPIRIN (ECOTRIN) 81 MG EC TABLET    Take 81 mg by mouth once daily.    CALCIUM-VITAMIN D3 (CALCIUM 500 + D) 500 MG(1,250MG) -200 UNIT PER TABLET    Take 1 tablet by mouth 2 (two) times daily with meals.

## 2020-11-25 NOTE — PROGRESS NOTES
Verified pt ID using name and . Allergies verified with pt. Administered Flu High Dose Vaccine IM in Left deltoid, Prevnar 13 IM in Right deltoid, and Kenalog 60 mg IM in Left VG per physician order using aseptic technique. Aspirated and no blood return noted. Pt tolerated well with no adverse reactions noted.

## 2020-12-09 PROBLEM — L03.115 CELLULITIS OF RIGHT LOWER EXTREMITY: Status: ACTIVE | Noted: 2020-01-01

## 2020-12-16 NOTE — PROGRESS NOTES
Spoke with patient's daughter, would like to know if patient should be taking Lasix. States she has been told different things and is not really sure. Message sent to PCP.

## 2020-12-16 NOTE — PATIENT INSTRUCTIONS
Discharge Instructions for Cellulitis  You have been diagnosed with cellulitis. This is an infection in the deepest layer of the skin. In some cases, the infection also affects the muscle. Cellulitis is caused by bacteria. The bacteria can enter the body through broken skin. This can happen with a cut, scratch, animal bite, or an insect bite that has been scratched. You may have been treated in the hospital with antibiotics and fluids. You will likely be given a prescription for antibiotics to take at home. This sheet will help you take care of yourself at home.  Home care  When you are home:  · Take the prescribed antibiotic medicine you are given as directed until it is gone. Take it even if you feel better. It treats the infection and stops it from returning. Not taking all the medicine can make future infections hard to treat.  · Keep the infected area clean.  · When possible, raise the infected area above the level of your heart. This helps keep swelling down.  · Talk with your healthcare provider if you are in pain. Ask what kind of over-the-counter medicine you can take for pain.  · Apply clean bandages as advised.  · Take your temperature once a day for a week.  · Wash your hands often to prevent spreading the infection.  In the future, wash your hands before and after you touch cuts, scratches, or bandages. This will help prevent infection.   When to call your healthcare provider  Call your healthcare provider immediately if you have any of the following:  · Difficulty or pain when moving the joints above or below the infected area  · Discharge or pus draining from the area  · Fever of 100.4°F (38°C) or higher, or as directed by your healthcare provider  · Pain that gets worse in or around the infected   · Redness that gets worse in or around the infected area, particularly if the area of redness expands to a wider area  · Shaking chills  · Swelling of the infected area  · Vomiting   Date Last Reviewed:  8/1/2016  © 1276-8301 [employer]. [address] [place] [address], [place] [address]. All rights reserved. This information is not intended as a substitute for professional medical care. Always follow your healthcare professional's instructions.

## 2020-12-18 NOTE — TELEPHONE ENCOUNTER
----- Message from Rebecca Wolfe LPN sent at 12/16/2020  4:26 PM CST -----  Spoke with patient's daughter, would like to know if patient should be taking Lasix. States she has been told different things and is not really sure. Please contact patient's daughter, Tia to discuss.      Respectfully,  Rebecca Wolfe LPN  [facility]    [email or web address]       Please do not reply to this message, as this inbox is not routinely monitored.

## 2020-12-18 NOTE — TELEPHONE ENCOUNTER
Called pt. Daughter adriane, no answer. Unable to leave  as her mailbox is full. No other phone #'s listed in pt. Chart to contact regarding her lasix.

## 2021-01-01 ENCOUNTER — TELEPHONE (OUTPATIENT)
Dept: PRIMARY CARE CLINIC | Facility: CLINIC | Age: 86
End: 2021-01-01

## 2021-01-01 ENCOUNTER — DOCUMENT SCAN (OUTPATIENT)
Dept: HOME HEALTH SERVICES | Facility: HOSPITAL | Age: 86
End: 2021-01-01
Payer: MEDICARE

## 2021-01-01 ENCOUNTER — CARE AT HOME (OUTPATIENT)
Dept: HOME HEALTH SERVICES | Facility: CLINIC | Age: 86
End: 2021-01-01
Payer: MEDICARE

## 2021-01-01 ENCOUNTER — EXTERNAL HOME HEALTH (OUTPATIENT)
Dept: HOME HEALTH SERVICES | Facility: HOSPITAL | Age: 86
End: 2021-01-01
Payer: MEDICARE

## 2021-01-01 ENCOUNTER — OFFICE VISIT (OUTPATIENT)
Dept: PRIMARY CARE CLINIC | Facility: CLINIC | Age: 86
End: 2021-01-01
Payer: MEDICARE

## 2021-01-01 ENCOUNTER — PATIENT MESSAGE (OUTPATIENT)
Dept: ADMINISTRATIVE | Facility: HOSPITAL | Age: 86
End: 2021-01-01

## 2021-01-01 VITALS
DIASTOLIC BLOOD PRESSURE: 65 MMHG | HEART RATE: 73 BPM | TEMPERATURE: 98 F | RESPIRATION RATE: 18 BRPM | SYSTOLIC BLOOD PRESSURE: 120 MMHG | OXYGEN SATURATION: 99 %

## 2021-01-01 VITALS
DIASTOLIC BLOOD PRESSURE: 70 MMHG | RESPIRATION RATE: 20 BRPM | TEMPERATURE: 98 F | HEART RATE: 55 BPM | SYSTOLIC BLOOD PRESSURE: 140 MMHG | OXYGEN SATURATION: 100 %

## 2021-01-01 VITALS
RESPIRATION RATE: 20 BRPM | TEMPERATURE: 99 F | SYSTOLIC BLOOD PRESSURE: 125 MMHG | DIASTOLIC BLOOD PRESSURE: 78 MMHG | OXYGEN SATURATION: 99 % | HEART RATE: 69 BPM

## 2021-01-01 DIAGNOSIS — I87.2 VENOUS STASIS ULCER OF LEFT CALF WITHOUT VARICOSE VEINS, UNSPECIFIED ULCER STAGE: Primary | ICD-10-CM

## 2021-01-01 DIAGNOSIS — R26.9 GAIT ABNORMALITY: ICD-10-CM

## 2021-01-01 DIAGNOSIS — F29 PSYCHOSIS, UNSPECIFIED PSYCHOSIS TYPE: Primary | ICD-10-CM

## 2021-01-01 DIAGNOSIS — I82.513 CHRONIC DEEP VEIN THROMBOSIS (DVT) OF FEMORAL VEIN OF BOTH LOWER EXTREMITIES: ICD-10-CM

## 2021-01-01 DIAGNOSIS — R41.3 DETERIORATION OF MEMORY: ICD-10-CM

## 2021-01-01 DIAGNOSIS — L03.115 CELLULITIS OF RIGHT LOWER EXTREMITY: ICD-10-CM

## 2021-01-01 DIAGNOSIS — N39.0 CHRONIC URINARY TRACT INFECTION: ICD-10-CM

## 2021-01-01 DIAGNOSIS — F29 PSYCHOSIS, UNSPECIFIED PSYCHOSIS TYPE: ICD-10-CM

## 2021-01-01 DIAGNOSIS — I82.513 CHRONIC DEEP VEIN THROMBOSIS (DVT) OF FEMORAL VEIN OF BOTH LOWER EXTREMITIES: Primary | ICD-10-CM

## 2021-01-01 DIAGNOSIS — L03.116 CELLULITIS OF LEFT LOWER EXTREMITY: ICD-10-CM

## 2021-01-01 DIAGNOSIS — L89.313 PRESSURE ULCER OF RIGHT BUTTOCK, STAGE 3: ICD-10-CM

## 2021-01-01 DIAGNOSIS — I10 ESSENTIAL HYPERTENSION: ICD-10-CM

## 2021-01-01 DIAGNOSIS — M79.605 PAIN IN BOTH LOWER EXTREMITIES: ICD-10-CM

## 2021-01-01 DIAGNOSIS — I83.022 VENOUS STASIS ULCER OF LEFT CALF, UNSPECIFIED ULCER STAGE, UNSPECIFIED WHETHER VARICOSE VEINS PRESENT: Primary | ICD-10-CM

## 2021-01-01 DIAGNOSIS — F03.90 DEMENTIA WITHOUT BEHAVIORAL DISTURBANCE, UNSPECIFIED DEMENTIA TYPE: ICD-10-CM

## 2021-01-01 DIAGNOSIS — L97.229 VENOUS STASIS ULCER OF LEFT CALF, UNSPECIFIED ULCER STAGE, UNSPECIFIED WHETHER VARICOSE VEINS PRESENT: Primary | ICD-10-CM

## 2021-01-01 DIAGNOSIS — R45.86 MOOD SWINGS: ICD-10-CM

## 2021-01-01 DIAGNOSIS — R29.898 SEVERE MUSCLE DECONDITIONING: ICD-10-CM

## 2021-01-01 DIAGNOSIS — R62.7 FAILURE TO THRIVE IN ADULT: Primary | ICD-10-CM

## 2021-01-01 DIAGNOSIS — L03.116 CELLULITIS OF LEFT LEG: ICD-10-CM

## 2021-01-01 DIAGNOSIS — M79.604 PAIN IN BOTH LOWER EXTREMITIES: ICD-10-CM
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
## 2021-01-01 DIAGNOSIS — R54 FRAILTY SYNDROME IN GERIATRIC PATIENT: ICD-10-CM

## 2021-01-01 DIAGNOSIS — L97.229 VENOUS STASIS ULCER OF LEFT CALF WITHOUT VARICOSE VEINS, UNSPECIFIED ULCER STAGE: Primary | ICD-10-CM

## 2021-01-01 DIAGNOSIS — E11.9 TYPE 2 DIABETES MELLITUS WITHOUT COMPLICATION, WITHOUT LONG-TERM CURRENT USE OF INSULIN: ICD-10-CM

## 2021-01-01 DIAGNOSIS — H91.90 HEARING LOSS, UNSPECIFIED HEARING LOSS TYPE, UNSPECIFIED LATERALITY: Primary | ICD-10-CM

## 2021-01-01 DIAGNOSIS — I82.432 ACUTE DEEP VEIN THROMBOSIS (DVT) OF POPLITEAL VEIN OF LEFT LOWER EXTREMITY: ICD-10-CM

## 2021-01-01 PROCEDURE — 99442 PR PHYSICIAN TELEPHONE EVALUATION 11-20 MIN: ICD-10-PCS | Mod: 95,,, | Performed by: INTERNAL MEDICINE

## 2021-01-01 PROCEDURE — 99350 PR HOME VISIT,ESTAB PATIENT,LEVEL IV: ICD-10-PCS | Mod: GW,S$GLB,ICN, | Performed by: NURSE PRACTITIONER

## 2021-01-01 PROCEDURE — 99350 HOME/RES VST EST HIGH MDM 60: CPT | Mod: GW,S$GLB,ICN, | Performed by: NURSE PRACTITIONER

## 2021-01-01 PROCEDURE — 99350 HOME/RES VST EST HIGH MDM 60: CPT | Mod: S$GLB,,, | Performed by: NURSE PRACTITIONER

## 2021-01-01 PROCEDURE — G0180 MD CERTIFICATION HHA PATIENT: HCPCS | Mod: ,,, | Performed by: INTERNAL MEDICINE

## 2021-01-01 PROCEDURE — 99350 PR HOME VISIT,ESTAB PATIENT,LEVEL IV: ICD-10-PCS | Mod: S$GLB,,, | Performed by: NURSE PRACTITIONER

## 2021-01-01 PROCEDURE — G0180 PR HOME HEALTH MD CERTIFICATION: ICD-10-PCS | Mod: ,,, | Performed by: INTERNAL MEDICINE

## 2021-01-01 PROCEDURE — 99442 PR PHYSICIAN TELEPHONE EVALUATION 11-20 MIN: CPT | Mod: 95,,, | Performed by: INTERNAL MEDICINE

## 2021-01-01 RX ORDER — RISPERIDONE 1 MG/1
1 TABLET ORAL 2 TIMES DAILY
Qty: 60 TABLET | Refills: 1 | Status: SHIPPED | OUTPATIENT
Start: 2021-01-01 | End: 2021-01-01

## 2021-02-20 PROBLEM — R45.86 MOOD SWINGS: Status: ACTIVE | Noted: 2021-01-01

## 2021-02-20 PROBLEM — R26.9 GAIT ABNORMALITY: Status: ACTIVE | Noted: 2021-01-01

## 2021-02-20 PROBLEM — M79.606 PAIN OF LOWER EXTREMITY: Status: ACTIVE | Noted: 2021-01-01

## 2021-03-26 PROBLEM — R06.02 SHORTNESS OF BREATH: Status: ACTIVE | Noted: 2021-01-01

## 2021-03-27 PROBLEM — I82.409 ACUTE DVT (DEEP VENOUS THROMBOSIS): Status: ACTIVE | Noted: 2021-01-01

## 2021-04-05 PROBLEM — I83.022 VENOUS STASIS ULCER OF LEFT CALF: Status: ACTIVE | Noted: 2021-01-01

## 2021-04-05 PROBLEM — E11.51 TYPE 2 DIABETES MELLITUS WITH DIABETIC PERIPHERAL ANGIOPATHY WITHOUT GANGRENE, WITHOUT LONG-TERM CURRENT USE OF INSULIN: Status: ACTIVE | Noted: 2021-01-01

## 2021-04-05 PROBLEM — L89.313 PRESSURE ULCER OF RIGHT BUTTOCK, STAGE 3: Status: ACTIVE | Noted: 2021-01-01

## 2021-04-05 PROBLEM — L97.229 VENOUS STASIS ULCER OF LEFT CALF: Status: ACTIVE | Noted: 2021-01-01

## 2021-04-05 PROBLEM — F03.90 DEMENTIA WITHOUT BEHAVIORAL DISTURBANCE: Status: ACTIVE | Noted: 2021-01-01

## 2021-04-18 PROBLEM — L03.116 CELLULITIS OF LEFT LOWER EXTREMITY: Status: RESOLVED | Noted: 2018-11-03 | Resolved: 2021-01-01
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
## 2021-04-18 PROBLEM — L03.115 CELLULITIS OF RIGHT LOWER EXTREMITY: Status: RESOLVED | Noted: 2020-01-01 | Resolved: 2021-01-01

## 2021-05-07 RX ORDER — HYDROCODONE BITARTRATE AND ACETAMINOPHEN 5; 325 MG/1; MG/1
1 TABLET ORAL EVERY 8 HOURS PRN
Qty: 60 TABLET | Refills: 0 | Status: SHIPPED | OUTPATIENT
Start: 2021-05-07

## 2022-02-01 ENCOUNTER — PATIENT MESSAGE (OUTPATIENT)
Dept: ADMINISTRATIVE | Facility: HOSPITAL | Age: 87
End: 2022-02-01
Payer: MEDICARE

## 2023-09-14 NOTE — TELEPHONE ENCOUNTER
----- Message from Nury Camarillo sent at 9/25/2019  2:38 PM CDT -----  Contact: daughter: Clara Pierre [phone]  She need to get her mother in a nursing home. Patient behavioral is  Really bad and combative and will not comply. She's refusing to go to a nursing home, the private care taker can control her, will not take her medications and throws her feces at everyone and all over the house. Please call her.   None

## 2025-01-11 NOTE — TELEPHONE ENCOUNTER
----- Message from Jennie Chandra sent at 12/12/2017  1:39 PM CST -----  Contact: Gracy Dubose caregiver called regarding home health services stated no one ever came to see about the patient. Requesting a call back at [phone] or [phone]. Thanks,  
Call edda to see pt already see her before? Or which ever H/H see her before  
Please sign referral for home health  
Home